# Patient Record
Sex: FEMALE | Race: WHITE
[De-identification: names, ages, dates, MRNs, and addresses within clinical notes are randomized per-mention and may not be internally consistent; named-entity substitution may affect disease eponyms.]

---

## 2017-04-07 ENCOUNTER — HOSPITAL ENCOUNTER (OUTPATIENT)
Dept: HOSPITAL 47 - RADECHMAIN | Age: 72
Discharge: HOME | End: 2017-04-07
Payer: MEDICARE

## 2017-04-07 DIAGNOSIS — I34.8: Primary | ICD-10-CM

## 2017-04-07 DIAGNOSIS — I65.23: ICD-10-CM

## 2017-04-07 DIAGNOSIS — I34.0: ICD-10-CM

## 2017-04-07 DIAGNOSIS — I07.1: ICD-10-CM

## 2017-04-07 PROCEDURE — 93880 EXTRACRANIAL BILAT STUDY: CPT

## 2017-04-07 PROCEDURE — 93306 TTE W/DOPPLER COMPLETE: CPT

## 2017-04-07 NOTE — US
EXAMINATION TYPE: US carotid duplex BILAT

 

DATE OF EXAM: 4/7/2017 3:26 PM

 

COMPARISON: NONE

 

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

 

EXAM MEASUREMENTS: 

 

RIGHT:  Peak Systolic Velocity (PSV) cm/sec

----- Right CCA:  80.3  

----- Right ICA:   86.7     

----- Right ECA:  98.0   

ICA/CCA ratio:    1.1    

 

RIGHT:  End Diastole cm/sec

----- Right CCA:  21.0   

----- Right ICA:   32.3      

----- Right ECA:  10.9     

 

LEFT:  Peak Systolic Velocity (PSV) cm/sec

----- Left CCA:  77.8  

----- Left ICA:   97.6   

----- Left ECA:  94.3  

ICA/CCA ratio:  1.3  

 

LEFT:  End Diastole cm/sec

----- Left CCA:  21.6  

----- Left ICA:   35.9   

----- Left ECA:   9.5 

 

VERTEBRALS (direction of flow):

Right Vertebral: Antegrade

Left Vertebral: Antegrade

 

No significant stenosis seen, no elevated velocities, bilateral plaque noted

 

Grayscale images show mild eccentric hyperechoic plaque at bilateral carotid bulbs. Velocity measurem
ents and ratios remain within normal limits bilaterally.

 

IMPRESSION:  No hemodynamically significant stenosis is seen in either internal carotid artery.

## 2017-04-08 NOTE — ECHOF
Referral Reason:I34.9 Mitral valve disorder, I65.23 Stenosis



MEASUREMENTS

--------

HEIGHT: 154.9 cm

WEIGHT: 59.4 kg

BP: 

IVSd:   0.9 cm     (0.6 - 1.1)

LVIDd:   4.2 cm     (3.9 - 5.3)

LVPWd:   0.9 cm     (0.6 - 1.1)

IVSs:   1.8 cm

LVIDs:   2.0 cm

LVPWs:   1.7 cm

LAESV Index (A-L):   31.66 ml/m

Ao Diam:   2.6 cm     (2.0 - 3.7)

AV Cusp:   1.2 cm     (1.5 - 2.6)

LA Diam:   2.8 cm     (2.7 - 3.8)

MV EXCURSION:   12.148 mm     (> 18.000)

MV EF SLOPE:   47 mm/s     (70 - 150)

EPSS:   0.5 cm

MV E Darío:   1.06 m/s

MV DecT:   225 ms

MV A Darío:   1.38 m/s

MV E/A Ratio:   0.77 

AV maxP.83 mmHg

AV meanP.36 mmHg

RAP:   5.00 mmHg

RVSP:   18.64 mmHg







FINDINGS

--------

Sinus rhythm.

This was a technically good study.

Left ventricular wall thickness is normal.   Overall 

left ventricular systolic function is normal with, an 

EF between 55 - 60 %.

The right ventricle is normal in size and function.

LA is midly dilated 29-33ml/m2.

The right atrium is normal in size.

Aortic valve is trileaflet and is mildly thickened.

The mitral valve leaflets are moderately thickened.   

Moderate mitral annular calcification present.   Mild 

mitral regurgitation is present.

Mild tricuspid regurgitation present.   The right 

ventricular systolic pressure, as measured by Doppler, 

is 18.64mmHg.

Pulmonic valve appears structurally normal.

The aortic root size is normal.

The pericardium is normal.



CONCLUSIONS

--------

1. Sinus rhythm.

2. Moderate mitral annular calcification present.

3. Mild mitral regurgitation is present.

4. Mild tricuspid regurgitation present.

5. The right ventricular systolic pressure, as measured by 

Doppler, is 18.64mmHg.

6. Pulmonic valve appears structurally normal.

7. The aortic root size is normal.

8. The pericardium is normal.

9. This was a technically good study.

10. Left ventricular wall thickness is normal.

11. Overall left ventricular systolic function is normal 

with, an EF between 55 - 60 %.

12. The right ventricle is normal in size and function.

13. LA is midly dilated 29-33ml/m2.

14. The right atrium is normal in size.

15. Aortic valve is trileaflet and is mildly thickened.

16. The mitral valve leaflets are moderately thickened.





SONOGRAPHER: Gina Davis RDCS

## 2018-01-02 ENCOUNTER — HOSPITAL ENCOUNTER (OUTPATIENT)
Dept: HOSPITAL 47 - RADMAMWWP | Age: 73
Discharge: HOME | End: 2018-01-02
Payer: MEDICARE

## 2018-01-02 DIAGNOSIS — Z12.31: Primary | ICD-10-CM

## 2018-01-02 PROCEDURE — 77063 BREAST TOMOSYNTHESIS BI: CPT

## 2018-01-02 PROCEDURE — 77067 SCR MAMMO BI INCL CAD: CPT

## 2018-01-03 NOTE — MM
Reason for exam: screening  (asymptomatic).

Last mammogram was performed 1 year and 1 month ago.



History:

Patient is postmenopausal and history of other cancer.

Family history of breast cancer in mother at age 89, breast cancer in daughter at 

age 43, and breast cancer in daughter at age 49.



Physical Findings:

A clinical breast exam by your physician is recommended on an annual basis and 

results should be correlated with mammographic findings.



MG 3D Screening Mammo W/Cad

Bilateral CC and MLO view(s) were taken.

Prior study comparison: November 28, 2016, bilateral MG 3d screening mammo w/cad. 

November 24, 2015, bilateral MG 3d screening mammo w/cad.

The breast tissue is heterogeneously dense. This may lower the sensitivity of 

mammography.  Developing asymmetry in the left 12 o'clock position.

This finding is changed when compared with previous exams.





ASSESSMENT: Incomplete: need additional imaging evaluation, BI-RAD 0



RECOMMENDATION:

Special view mammogram of the left breast.



If lesion persists on supplemental views, image directed ultrasound is 

recommended.



[facility] will attempt to contact patient to return for supplemental 

views and ultrasound if indicated.

## 2018-01-12 ENCOUNTER — HOSPITAL ENCOUNTER (OUTPATIENT)
Dept: HOSPITAL 47 - RADMAMWWP | Age: 73
Discharge: HOME | End: 2018-01-12
Payer: MEDICARE

## 2018-01-12 DIAGNOSIS — R92.8: Primary | ICD-10-CM

## 2018-01-12 PROCEDURE — 77065 DX MAMMO INCL CAD UNI: CPT

## 2018-01-12 NOTE — MM
Reason for exam: additional evaluation requested from abnormal screening.

Last mammogram was performed less than 1 month ago.



History:

Patient is postmenopausal and history of other cancer.

Family history of breast cancer in mother at age 89, breast cancer in daughter at 

age 43, and breast cancer in daughter at age 49.



Physical Findings:

Nurse did not find any significant physical abnormalities on exam.



MG 3D Work Up W/Cad LT

Spot compression CC, spot compression MLO, and ML view(s) were taken of the left 

breast.

Prior study comparison: January 2, 2018, bilateral MG 3d screening mammo w/cad.  

November 28, 2016, bilateral MG 3d screening mammo w/cad.

The breast tissue is heterogeneously dense. This may lower the sensitivity of 

mammography.  No suspicious abnormality. The previously seen focal asymmetry 

resolves and fibroglandular tissue.



These results were verbally communicated with the patient and result sheet given 

to the patient on 1/12/18.





ASSESSMENT: Negative, BI-RAD 1



RECOMMENDATION:

Return to routine screening mammogram schedule for both breasts.

## 2018-04-09 ENCOUNTER — HOSPITAL ENCOUNTER (OUTPATIENT)
Dept: HOSPITAL 47 - LABWHC1 | Age: 73
Discharge: HOME | End: 2018-04-09
Payer: MEDICARE

## 2018-04-09 DIAGNOSIS — D64.9: ICD-10-CM

## 2018-04-09 DIAGNOSIS — R73.01: ICD-10-CM

## 2018-04-09 DIAGNOSIS — I10: ICD-10-CM

## 2018-04-09 DIAGNOSIS — M47.12: Primary | ICD-10-CM

## 2018-04-09 LAB
ALBUMIN SERPL-MCNC: 4.6 G/DL (ref 3.5–5)
ALP SERPL-CCNC: 131 U/L (ref 38–126)
ALT SERPL-CCNC: 23 U/L (ref 9–52)
ANION GAP SERPL CALC-SCNC: 14 MMOL/L
AST SERPL-CCNC: 19 U/L (ref 14–36)
BASOPHILS # BLD AUTO: 0 K/UL (ref 0–0.2)
BASOPHILS NFR BLD AUTO: 0 %
BUN SERPL-SCNC: 6 MG/DL (ref 7–17)
CALCIUM SPEC-MCNC: 10 MG/DL (ref 8.4–10.2)
CHLORIDE SERPL-SCNC: 96 MMOL/L (ref 98–107)
CHOLEST SERPL-MCNC: 236 MG/DL (ref ?–200)
CK SERPL-CCNC: 37 U/L (ref 30–135)
CO2 SERPL-SCNC: 26 MMOL/L (ref 22–30)
EOSINOPHIL # BLD AUTO: 0.2 K/UL (ref 0–0.7)
EOSINOPHIL NFR BLD AUTO: 3 %
ERYTHROCYTE [DISTWIDTH] IN BLOOD BY AUTOMATED COUNT: 4.16 M/UL (ref 3.8–5.4)
ERYTHROCYTE [DISTWIDTH] IN BLOOD: 12.6 % (ref 11.5–15.5)
GLUCOSE SERPL-MCNC: 105 MG/DL (ref 74–99)
HBA1C MFR BLD: 4.9 % (ref 4–6)
HCT VFR BLD AUTO: 42.4 % (ref 34–46)
HDLC SERPL-MCNC: 121 MG/DL (ref 40–60)
HGB BLD-MCNC: 14.2 GM/DL (ref 11.4–16)
LDLC SERPL CALC-MCNC: 102 MG/DL (ref 0–99)
LYMPHOCYTES # SPEC AUTO: 2.2 K/UL (ref 1–4.8)
LYMPHOCYTES NFR SPEC AUTO: 32 %
MAGNESIUM SPEC-SCNC: 1.7 MG/DL (ref 1.6–2.3)
MCH RBC QN AUTO: 34.2 PG (ref 25–35)
MCHC RBC AUTO-ENTMCNC: 33.5 G/DL (ref 31–37)
MCV RBC AUTO: 102.1 FL (ref 80–100)
MONOCYTES # BLD AUTO: 0.6 K/UL (ref 0–1)
MONOCYTES NFR BLD AUTO: 9 %
NEUTROPHILS # BLD AUTO: 3.7 K/UL (ref 1.3–7.7)
NEUTROPHILS NFR BLD AUTO: 54 %
PH UR: 7.5 [PH] (ref 5–8)
PLATELET # BLD AUTO: 348 K/UL (ref 150–450)
POTASSIUM SERPL-SCNC: 4.2 MMOL/L (ref 3.5–5.1)
PROT SERPL-MCNC: 7.7 G/DL (ref 6.3–8.2)
SODIUM SERPL-SCNC: 136 MMOL/L (ref 137–145)
SP GR UR: 1.01 (ref 1–1.03)
SQUAMOUS UR QL AUTO: 1 /HPF (ref 0–4)
T4 FREE SERPL-MCNC: 1.05 NG/DL (ref 0.78–2.19)
TRIGL SERPL-MCNC: 63 MG/DL (ref ?–150)
URATE SERPL-MCNC: 3 MG/DL (ref 3.7–7.4)
UROBILINOGEN UR QL STRIP: <2 MG/DL (ref ?–2)
VIT B12 SERPL-MCNC: 685 PG/ML (ref 200–944)
WBC # BLD AUTO: 6.9 K/UL (ref 3.8–10.6)
WBC #/AREA URNS HPF: 1 /HPF (ref 0–5)

## 2018-04-09 PROCEDURE — 83550 IRON BINDING TEST: CPT

## 2018-04-09 PROCEDURE — 84439 ASSAY OF FREE THYROXINE: CPT

## 2018-04-09 PROCEDURE — 82728 ASSAY OF FERRITIN: CPT

## 2018-04-09 PROCEDURE — 82550 ASSAY OF CK (CPK): CPT

## 2018-04-09 PROCEDURE — 80061 LIPID PANEL: CPT

## 2018-04-09 PROCEDURE — 83735 ASSAY OF MAGNESIUM: CPT

## 2018-04-09 PROCEDURE — 80053 COMPREHEN METABOLIC PANEL: CPT

## 2018-04-09 PROCEDURE — 81001 URINALYSIS AUTO W/SCOPE: CPT

## 2018-04-09 PROCEDURE — 84550 ASSAY OF BLOOD/URIC ACID: CPT

## 2018-04-09 PROCEDURE — 84443 ASSAY THYROID STIM HORMONE: CPT

## 2018-04-09 PROCEDURE — 82607 VITAMIN B-12: CPT

## 2018-04-09 PROCEDURE — 83540 ASSAY OF IRON: CPT

## 2018-04-09 PROCEDURE — 36415 COLL VENOUS BLD VENIPUNCTURE: CPT

## 2018-04-09 PROCEDURE — 82746 ASSAY OF FOLIC ACID SERUM: CPT

## 2018-04-09 PROCEDURE — 83036 HEMOGLOBIN GLYCOSYLATED A1C: CPT

## 2018-04-09 PROCEDURE — 85025 COMPLETE CBC W/AUTO DIFF WBC: CPT

## 2018-04-09 PROCEDURE — 82306 VITAMIN D 25 HYDROXY: CPT

## 2019-01-04 ENCOUNTER — HOSPITAL ENCOUNTER (OUTPATIENT)
Dept: HOSPITAL 47 - RADMAMWWP | Age: 74
Discharge: HOME | End: 2019-01-04
Attending: INTERNAL MEDICINE
Payer: MEDICARE

## 2019-01-04 DIAGNOSIS — Z12.31: Primary | ICD-10-CM

## 2019-01-04 PROCEDURE — 77067 SCR MAMMO BI INCL CAD: CPT

## 2019-01-04 PROCEDURE — 77063 BREAST TOMOSYNTHESIS BI: CPT

## 2019-01-13 NOTE — MM
Reason for exam: screening  (asymptomatic).

Last mammogram was performed 1 year ago.



History:

Patient is postmenopausal and history of other cancer.

Family history of breast cancer in mother at age 89, premenopausal breast cancer 

in daughter at age 43, and breast cancer in daughter at age 49.



MG 3D Screening Mammo W/Cad

Bilateral CC and MLO view(s) were taken.

Prior study comparison: January 12, 2018, left breast MG 3d work up w/cad LT.  

January 2, 2018, bilateral MG 3d screening mammo w/cad.

The breast tissue is heterogeneously dense. This may lower the sensitivity of 

mammography.

There are benign-appearing bilateral breast calcifications.  No suspicious 

abnormality.  No significant changes when compared with prior studies.





ASSESSMENT: Benign, BI-RAD 2



RECOMMENDATION:

Routine screening mammogram of both breasts in 1 year.

## 2019-04-10 ENCOUNTER — HOSPITAL ENCOUNTER (OUTPATIENT)
Dept: HOSPITAL 47 - LABWHC1 | Age: 74
Discharge: HOME | End: 2019-04-10
Attending: INTERNAL MEDICINE
Payer: MEDICARE

## 2019-04-10 DIAGNOSIS — M81.0: ICD-10-CM

## 2019-04-10 DIAGNOSIS — I10: ICD-10-CM

## 2019-04-10 DIAGNOSIS — R73.01: ICD-10-CM

## 2019-04-10 DIAGNOSIS — Z00.00: Primary | ICD-10-CM

## 2019-04-10 LAB
ALBUMIN SERPL-MCNC: 4.6 G/DL (ref 3.8–4.9)
ALBUMIN/GLOB SERPL: 2.09 G/DL (ref 1.6–3.17)
ALP SERPL-CCNC: 118 U/L (ref 41–126)
ALT SERPL-CCNC: 14 U/L (ref 8–44)
ANION GAP SERPL CALC-SCNC: 9.6 MMOL/L (ref 4–12)
AST SERPL-CCNC: 18 U/L (ref 13–35)
BASOPHILS # BLD AUTO: 0 K/UL (ref 0–0.2)
BASOPHILS NFR BLD AUTO: 1 %
BUN SERPL-SCNC: 6 MG/DL (ref 9–27)
CALCIUM SPEC-MCNC: 9.8 MG/DL (ref 8.7–10.3)
CHLORIDE SERPL-SCNC: 98 MMOL/L (ref 96–109)
CHOLEST SERPL-MCNC: 223 MG/DL (ref 0–200)
CK SERPL-CCNC: 39 U/L (ref 26–186)
CO2 SERPL-SCNC: 26.4 MMOL/L (ref 21.6–31.8)
EOSINOPHIL # BLD AUTO: 0.1 K/UL (ref 0–0.7)
EOSINOPHIL NFR BLD AUTO: 2 %
ERYTHROCYTE [DISTWIDTH] IN BLOOD BY AUTOMATED COUNT: 4.05 M/UL (ref 3.8–5.4)
ERYTHROCYTE [DISTWIDTH] IN BLOOD: 12.7 % (ref 11.5–15.5)
GLOBULIN SER CALC-MCNC: 2.2 G/DL (ref 1.6–3.3)
GLUCOSE SERPL-MCNC: 96 MG/DL (ref 70–110)
HBA1C MFR BLD: 5.1 % (ref 4–6)
HCT VFR BLD AUTO: 41.3 % (ref 34–46)
HDLC SERPL-MCNC: 108 MG/DL (ref 40–60)
HGB BLD-MCNC: 13.6 GM/DL (ref 11.4–16)
LDLC SERPL CALC-MCNC: 105 MG/DL (ref 0–131)
LYMPHOCYTES # SPEC AUTO: 2.1 K/UL (ref 1–4.8)
LYMPHOCYTES NFR SPEC AUTO: 37 %
MAGNESIUM SPEC-SCNC: 1.7 MG/DL (ref 1.5–2.4)
MCH RBC QN AUTO: 33.5 PG (ref 25–35)
MCHC RBC AUTO-ENTMCNC: 32.8 G/DL (ref 31–37)
MCV RBC AUTO: 102.1 FL (ref 80–100)
MONOCYTES # BLD AUTO: 0.5 K/UL (ref 0–1)
MONOCYTES NFR BLD AUTO: 9 %
NEUTROPHILS # BLD AUTO: 2.7 K/UL (ref 1.3–7.7)
NEUTROPHILS NFR BLD AUTO: 48 %
PH UR: 7.5 [PH] (ref 5–8)
PLATELET # BLD AUTO: 325 K/UL (ref 150–450)
POTASSIUM SERPL-SCNC: 4.3 MMOL/L (ref 3.5–5.5)
PROT SERPL-MCNC: 6.8 G/DL (ref 6.2–8.2)
SODIUM SERPL-SCNC: 134 MMOL/L (ref 135–145)
SP GR UR: 1 (ref 1–1.03)
SQUAMOUS UR QL AUTO: 1 /HPF (ref 0–4)
T4 FREE SERPL-MCNC: 1.2 NG/DL (ref 0.8–1.8)
TRIGL SERPL-MCNC: <50 MG/DL (ref 0–149)
URATE SERPL-MCNC: 2.8 MG/DL (ref 2.9–7.7)
UROBILINOGEN UR QL STRIP: <2 MG/DL (ref ?–2)
VLDLC SERPL CALC-MCNC: 9.98 MG/DL (ref 5–40)
WBC # BLD AUTO: 5.5 K/UL (ref 3.8–10.6)
WBC #/AREA URNS HPF: 1 /HPF (ref 0–5)

## 2019-04-10 PROCEDURE — 80053 COMPREHEN METABOLIC PANEL: CPT

## 2019-04-10 PROCEDURE — 85025 COMPLETE CBC W/AUTO DIFF WBC: CPT

## 2019-04-10 PROCEDURE — 82306 VITAMIN D 25 HYDROXY: CPT

## 2019-04-10 PROCEDURE — 84550 ASSAY OF BLOOD/URIC ACID: CPT

## 2019-04-10 PROCEDURE — 83036 HEMOGLOBIN GLYCOSYLATED A1C: CPT

## 2019-04-10 PROCEDURE — 80061 LIPID PANEL: CPT

## 2019-04-10 PROCEDURE — 83735 ASSAY OF MAGNESIUM: CPT

## 2019-04-10 PROCEDURE — 81001 URINALYSIS AUTO W/SCOPE: CPT

## 2019-04-10 PROCEDURE — 84439 ASSAY OF FREE THYROXINE: CPT

## 2019-04-10 PROCEDURE — 36415 COLL VENOUS BLD VENIPUNCTURE: CPT

## 2019-04-10 PROCEDURE — 84443 ASSAY THYROID STIM HORMONE: CPT

## 2019-04-10 PROCEDURE — 82550 ASSAY OF CK (CPK): CPT

## 2019-04-26 ENCOUNTER — HOSPITAL ENCOUNTER (OUTPATIENT)
Dept: HOSPITAL 47 - RADBDWWP | Age: 74
Discharge: HOME | End: 2019-04-26
Attending: INTERNAL MEDICINE
Payer: MEDICARE

## 2019-04-26 DIAGNOSIS — M85.80: Primary | ICD-10-CM

## 2019-04-26 PROCEDURE — 77080 DXA BONE DENSITY AXIAL: CPT

## 2019-04-29 NOTE — BD
EXAMINATION TYPE: Axial Bone Density

 

DATE OF EXAM: 4/26/2019

 

COMPARISON: 4/7/2017

 

CLINICAL HISTORY: 73-year-old female age-related osteoporosis

 

Height:  61

Weight:  140.0

 

FRAX RISK QUESTIONS:

Alcohol (3 or more units per day):  no

Family History (Parent hip fracture):  no

Glucocorticoids (More than 3mos):  no

           (Ex: prednisone, prednisolone, methylprednisolone, dexamethasone, and hydrocortisone).    
     

History of Fracture in Adulthood: yes

Secondary Osteoporosis:

  1.  Type 1 Diabetes: no

  2.  Hyperthyroidism: no

  3.  Menopause before 45: no

  4.  Malnutrition: no

  5.  Chronic liver disease: no

Rheumatoid Arthritis: no

Current Tobacco Use: no

 

RISK FACTORS 

HISTORY OF: 

Hip Fracture (Right/Left): right

When: 2010

Surgery to Spine/Hip(right/left)/Wrist (right/left): right hip /right femur

When: 2010

Family History of Osteoporosis: yes

Active: yes

Diet low in dairy products/other sources of calcium:  no

Postmenopausal woman: age 48

Lost more than 2 inches in height since high school: yes

 

MEDICATIONS: blood pressure

 

 

Additional History:

 

 

EXAM MEASUREMENTS: 

Bone mineral densitometry was performed using the [employer] System.

Bone mineral density as measured about the Lumbar spine is:  

----- L1-L4(G/cm2): 1.445

T Score Values are as follows:

----- L2: 1.2

----- L3: 1.8

----- L4: 3.0

----- L1-L4: 2.2

Bone mineral density has: decreased -2.1 % since study of: 4.7.2017

 

Bone mineral density about the L hip (g/cm2): 0.812

T Score values are as follows:

-----L Neck: -1.6

----L Total: -0.9

Bone mineral density has: increased 1.9 % since study of: 4.7.2017

 

 

IMPRESSION:

Osteopenia (T Score between -2.5 and -1).

 

There is slightly increased risk of fracture and the patient may be considered 

for treatment. 

 

Re-Screen 2-5 years.

 

 

 

 

 

NOTE:  T-SCORE=SD OF THE YOUNG ADULT MEAN.

## 2019-10-14 ENCOUNTER — HOSPITAL ENCOUNTER (OUTPATIENT)
Dept: HOSPITAL 47 - LABWHC1 | Age: 74
Discharge: HOME | End: 2019-10-14
Attending: INTERNAL MEDICINE
Payer: MEDICARE

## 2019-10-14 DIAGNOSIS — I10: Primary | ICD-10-CM

## 2019-10-14 DIAGNOSIS — E78.2: ICD-10-CM

## 2019-10-14 LAB
ALBUMIN SERPL-MCNC: 4.4 G/DL (ref 3.8–4.9)
ALBUMIN/GLOB SERPL: 2.1 G/DL (ref 1.6–3.17)
ALP SERPL-CCNC: 116 U/L (ref 41–126)
ALT SERPL-CCNC: 16 U/L (ref 8–44)
ANION GAP SERPL CALC-SCNC: 12.1 MMOL/L (ref 4–12)
AST SERPL-CCNC: 19 U/L (ref 13–35)
BASOPHILS # BLD AUTO: 0 K/UL (ref 0–0.2)
BASOPHILS NFR BLD AUTO: 1 %
BUN SERPL-SCNC: 7 MG/DL (ref 9–27)
BUN/CREAT SERPL: 14 RATIO (ref 12–20)
CALCIUM SPEC-MCNC: 9.5 MG/DL (ref 8.7–10.3)
CHLORIDE SERPL-SCNC: 95 MMOL/L (ref 96–109)
CHOLEST SERPL-MCNC: 216 MG/DL (ref 0–200)
CO2 SERPL-SCNC: 27.9 MMOL/L (ref 21.6–31.8)
EOSINOPHIL # BLD AUTO: 0.2 K/UL (ref 0–0.7)
EOSINOPHIL NFR BLD AUTO: 2 %
ERYTHROCYTE [DISTWIDTH] IN BLOOD BY AUTOMATED COUNT: 3.86 M/UL (ref 3.8–5.4)
ERYTHROCYTE [DISTWIDTH] IN BLOOD: 11.9 % (ref 11.5–15.5)
GLOBULIN SER CALC-MCNC: 2.1 G/DL (ref 1.6–3.3)
GLUCOSE SERPL-MCNC: 102 MG/DL (ref 70–110)
HBA1C MFR BLD: 5.2 % (ref 4–6)
HCT VFR BLD AUTO: 40.3 % (ref 34–46)
HDLC SERPL-MCNC: 102 MG/DL (ref 40–60)
HGB BLD-MCNC: 13.7 GM/DL (ref 11.4–16)
LYMPHOCYTES # SPEC AUTO: 2.2 K/UL (ref 1–4.8)
LYMPHOCYTES NFR SPEC AUTO: 30 %
MCH RBC QN AUTO: 35.5 PG (ref 25–35)
MCHC RBC AUTO-ENTMCNC: 34 G/DL (ref 31–37)
MCV RBC AUTO: 104.4 FL (ref 80–100)
MONOCYTES # BLD AUTO: 0.5 K/UL (ref 0–1)
MONOCYTES NFR BLD AUTO: 7 %
NEUTROPHILS # BLD AUTO: 4.2 K/UL (ref 1.3–7.7)
NEUTROPHILS NFR BLD AUTO: 57 %
PLATELET # BLD AUTO: 302 K/UL (ref 150–450)
POTASSIUM SERPL-SCNC: 4.6 MMOL/L (ref 3.5–5.5)
PROT SERPL-MCNC: 6.5 G/DL (ref 6.2–8.2)
SODIUM SERPL-SCNC: 135 MMOL/L (ref 135–145)
T4 FREE SERPL-MCNC: 1 NG/DL (ref 0.8–1.8)
TRIGL SERPL-MCNC: <50 MG/DL (ref 0–149)
URATE SERPL-MCNC: 3.1 MG/DL (ref 2.9–7.7)
WBC # BLD AUTO: 7.3 K/UL (ref 3.8–10.6)

## 2019-10-14 PROCEDURE — 80061 LIPID PANEL: CPT

## 2019-10-14 PROCEDURE — 84439 ASSAY OF FREE THYROXINE: CPT

## 2019-10-14 PROCEDURE — 85025 COMPLETE CBC W/AUTO DIFF WBC: CPT

## 2019-10-14 PROCEDURE — 84550 ASSAY OF BLOOD/URIC ACID: CPT

## 2019-10-14 PROCEDURE — 83036 HEMOGLOBIN GLYCOSYLATED A1C: CPT

## 2019-10-14 PROCEDURE — 80053 COMPREHEN METABOLIC PANEL: CPT

## 2019-10-14 PROCEDURE — 36415 COLL VENOUS BLD VENIPUNCTURE: CPT

## 2019-10-14 PROCEDURE — 84443 ASSAY THYROID STIM HORMONE: CPT

## 2020-01-22 ENCOUNTER — HOSPITAL ENCOUNTER (OUTPATIENT)
Dept: HOSPITAL 47 - RADMAMWWP | Age: 75
Discharge: HOME | End: 2020-01-22
Attending: INTERNAL MEDICINE
Payer: MEDICARE

## 2020-01-22 DIAGNOSIS — Z12.31: Primary | ICD-10-CM

## 2020-01-22 PROCEDURE — 77063 BREAST TOMOSYNTHESIS BI: CPT

## 2020-01-22 PROCEDURE — 77067 SCR MAMMO BI INCL CAD: CPT

## 2020-01-23 NOTE — MM
Reason for exam: screening  (asymptomatic).

Last mammogram was performed 1 year and 1 month ago.



History:

Patient is postmenopausal and history of other cancer.

Family history of breast cancer in mother at age 89, premenopausal breast cancer 

in daughter at age 43, and breast cancer in daughter at age 49.



Physical Findings:

A clinical breast exam by your physician is recommended on an annual basis and 

results should be correlated with mammographic findings.



MG 3D Screening Mammo W/Cad

Bilateral CC and MLO view(s) were taken.

Prior study comparison: January 4, 2019, bilateral MG 3d screening mammo w/cad.  

January 12, 2018, left breast MG 3d work up w/cad LT.

The breast tissue is heterogeneously dense. This may lower the sensitivity of 

mammography.  There is no discrete abnormality.  No significant changes when 

compared with prior studies.





ASSESSMENT: Negative, BI-RAD 1



RECOMMENDATION:

Routine screening mammogram of both breasts in 1 year.

## 2021-01-18 ENCOUNTER — HOSPITAL ENCOUNTER (OUTPATIENT)
Dept: HOSPITAL 47 - RADUSWWP | Age: 76
Discharge: HOME | End: 2021-01-18
Attending: INTERNAL MEDICINE
Payer: MEDICARE

## 2021-01-18 DIAGNOSIS — M79.604: Primary | ICD-10-CM

## 2021-01-18 NOTE — US
EXAMINATION TYPE: US venous doppler duplex LE RT

 

DATE OF EXAM: 1/18/2021 12:49 PM

 

COMPARISON: NONE

 

CLINICAL HISTORY: 75-year-old female M79.604 Right leg pain.

 

SIDE PERFORMED: Right  

 

TECHNIQUE:  The lower extremity deep venous system is examined utilizing real time linear array sonog
ana with graded compression, doppler sonography and color-flow sonography.

 

FINDINGS:

 

VESSELS IMAGED:

Common Femoral Vein

Deep Femoral Vein

Greater Saphenous Vein *

Femoral Vein

Popliteal Vein

Small Saphenous Vein *

Proximal Calf Veins

(* superficial vessels)

 

 

 

Right Leg:  Negative for DVT

 

 

 

IMPRESSION:

No evidence for DVT within the right lower extremity imaged from the groin to the upper calf.

## 2021-01-25 ENCOUNTER — HOSPITAL ENCOUNTER (OUTPATIENT)
Dept: HOSPITAL 47 - RADMAMWWP | Age: 76
Discharge: HOME | End: 2021-01-25
Attending: INTERNAL MEDICINE
Payer: MEDICARE

## 2021-01-25 DIAGNOSIS — Z12.31: Primary | ICD-10-CM

## 2021-01-25 PROCEDURE — 77063 BREAST TOMOSYNTHESIS BI: CPT

## 2021-01-25 PROCEDURE — 77067 SCR MAMMO BI INCL CAD: CPT

## 2021-01-26 NOTE — MM
Reason for exam: screening  (asymptomatic).

Last mammogram was performed 1 year ago.



History:

Patient is postmenopausal and history of other cancer.

Family history of breast cancer in mother at age 89, premenopausal breast cancer 

in daughter at age 43, and breast cancer in daughter at age 49.

Took hormonal contraceptives for 5 years.



Physical Findings:

A clinical breast exam by your physician is recommended on an annual basis and 

results should be correlated with mammographic findings.



MG 3D Screening Mammo W/Cad

Bilateral CC and MLO view(s) were taken.

Prior study comparison: January 22, 2020, bilateral MG 3d screening mammo w/cad.  

January 4, 2019, bilateral MG 3d screening mammo w/cad.

The breast tissue is heterogeneously dense. This may lower the sensitivity of 

mammography.  Stable vascular calcifications. There is no discrete abnormality.  

No significant changes when compared with prior studies.





ASSESSMENT: Benign, BI-RAD 2



RECOMMENDATION:

Routine screening mammogram of both breasts in 1 year.

## 2021-04-28 ENCOUNTER — HOSPITAL ENCOUNTER (OUTPATIENT)
Dept: HOSPITAL 47 - RADBDWWP | Age: 76
Discharge: HOME | End: 2021-04-28
Attending: INTERNAL MEDICINE
Payer: MEDICARE

## 2021-04-28 DIAGNOSIS — M81.8: Primary | ICD-10-CM

## 2021-04-28 PROCEDURE — 77080 DXA BONE DENSITY AXIAL: CPT

## 2021-04-30 NOTE — BD
EXAMINATION TYPE: Axial Bone Density

 

DATE OF EXAM: 4/28/2021

 

COMPARISON: NONE

 

CLINICAL HISTORY:

 

Height:  60.5

Weight:  142.5

 

FRAX RISK QUESTIONS:

Alcohol (3 or more units per day):  no

Family History (Parent hip fracture):  no

Glucocorticoids (More than 3mos):  no

           (Ex: prednisone, prednisolone, methylprednisolone, dexamethasone, and hydrocortisone).    
     

History of Fracture in Adulthood: yes

Secondary Osteoporosis:

  1.  Type 1 Diabetes: no

  2.  Hyperthyroidism: no

  3.  Menopause before 45: no

  4.  Malnutrition: no

  5.  Chronic liver disease: no

Rheumatoid Arthritis: yes

Current Tobacco Use: no

 

RISK FACTORS 

HISTORY OF: 

Hip Fracture (Right/Left): right 

When: 4 years ago

Surgery to Spine/Hip(right/left)/Wrist (right/left): right hip 

When: 4 years

Family History of Osteoporosis: yes

Active: yes

Diet low in dairy products/other sources of calcium:  no

Postmenopausal woman: age 48

Lost more than 2 inches in height since high school: yes

 

 

MEDICATIONS: scanned into pacs

 

 

Additional History:

 

 

EXAM MEASUREMENTS: 

Bone mineral densitometry was performed using the [employer] System.

Bone mineral density as measured about the Lumbar spine is:  

----- L1-L4(G/cm2): 1.514

T Score Values are as follows:

----- L2: 1.8

----- L3: 2.8

----- L4: 3.7

----- L1-L4: 2.8

Bone mineral density has: increased 5.6 % since study of: 4.26.2019

 

Bone mineral density about the L hip (g/cm2): 0.818

T Score values are as follows:

-----L Neck: -1.6

-----L Total: -0.9

Bone mineral density has: decreased -0.7 % since study of: 4.26.2019

 

 

IMPRESSION:

No evidence for osteoporosis or osteopenia

 

NOTE:  T-SCORE=SD OF THE YOUNG ADULT MEAN.

## 2021-07-08 ENCOUNTER — HOSPITAL ENCOUNTER (OUTPATIENT)
Dept: HOSPITAL 47 - LABWHC1 | Age: 76
Discharge: HOME | End: 2021-07-08
Attending: INTERNAL MEDICINE
Payer: MEDICARE

## 2021-07-08 DIAGNOSIS — I10: Primary | ICD-10-CM

## 2021-07-08 DIAGNOSIS — E78.2: ICD-10-CM

## 2021-07-08 LAB
ALBUMIN SERPL-MCNC: 4.5 G/DL (ref 3.8–4.9)
ALBUMIN/GLOB SERPL: 1.8 G/DL (ref 1.6–3.17)
ALP SERPL-CCNC: 123 U/L (ref 41–126)
ALT SERPL-CCNC: 16 U/L (ref 8–44)
ANION GAP SERPL CALC-SCNC: 9.2 MMOL/L (ref 4–12)
AST SERPL-CCNC: 23 U/L (ref 13–35)
BASOPHILS # BLD AUTO: 0.06 X 10*3/UL (ref 0–0.1)
BASOPHILS NFR BLD AUTO: 0.9 %
BUN SERPL-SCNC: 6 MG/DL (ref 9–27)
BUN/CREAT SERPL: 12 RATIO (ref 12–20)
CALCIUM SPEC-MCNC: 9.4 MG/DL (ref 8.7–10.3)
CHLORIDE SERPL-SCNC: 97 MMOL/L (ref 96–109)
CHOLEST SERPL-MCNC: 202 MG/DL (ref 0–200)
CO2 SERPL-SCNC: 25.8 MMOL/L (ref 21.6–31.8)
EOSINOPHIL # BLD AUTO: 0.16 X 10*3/UL (ref 0.04–0.35)
EOSINOPHIL NFR BLD AUTO: 2.4 %
ERYTHROCYTE [DISTWIDTH] IN BLOOD BY AUTOMATED COUNT: 3.84 X 10*6/UL (ref 4.1–5.2)
ERYTHROCYTE [DISTWIDTH] IN BLOOD: 13 % (ref 11.5–14.5)
GLOBULIN SER CALC-MCNC: 2.5 G/DL (ref 1.6–3.3)
GLUCOSE SERPL-MCNC: 97 MG/DL (ref 70–110)
HCT VFR BLD AUTO: 39.9 % (ref 37.2–46.3)
HDLC SERPL-MCNC: 107 MG/DL (ref 40–60)
HGB BLD-MCNC: 13.2 G/DL (ref 12–15)
LYMPHOCYTES # SPEC AUTO: 2.37 X 10*3/UL (ref 0.9–5)
LYMPHOCYTES NFR SPEC AUTO: 35.4 %
MCH RBC QN AUTO: 34.4 PG (ref 27–32)
MCHC RBC AUTO-ENTMCNC: 33.1 G/DL (ref 32–37)
MCV RBC AUTO: 103.9 FL (ref 80–97)
MONOCYTES # BLD AUTO: 0.89 X 10*3/UL (ref 0.2–1)
MONOCYTES NFR BLD AUTO: 13.3 %
NEUTROPHILS # BLD AUTO: 3.2 X 10*3/UL (ref 1.8–7.7)
NEUTROPHILS NFR BLD AUTO: 47.9 %
PLATELET # BLD AUTO: 337 X 10*3/UL (ref 140–440)
POTASSIUM SERPL-SCNC: 4.4 MMOL/L (ref 3.5–5.5)
PROT SERPL-MCNC: 7 G/DL (ref 6.2–8.2)
SODIUM SERPL-SCNC: 132 MMOL/L (ref 135–145)
TRIGL SERPL-MCNC: <50 MG/DL (ref 0–149)
WBC # BLD AUTO: 6.69 X 10*3/UL (ref 4.5–10)

## 2021-07-08 PROCEDURE — 84443 ASSAY THYROID STIM HORMONE: CPT

## 2021-07-08 PROCEDURE — 36415 COLL VENOUS BLD VENIPUNCTURE: CPT

## 2021-07-08 PROCEDURE — 85025 COMPLETE CBC W/AUTO DIFF WBC: CPT

## 2021-07-08 PROCEDURE — 80053 COMPREHEN METABOLIC PANEL: CPT

## 2021-07-08 PROCEDURE — 80061 LIPID PANEL: CPT

## 2021-08-03 ENCOUNTER — HOSPITAL ENCOUNTER (OUTPATIENT)
Dept: HOSPITAL 47 - RADECHMAIN | Age: 76
Discharge: HOME | End: 2021-08-03
Attending: INTERNAL MEDICINE
Payer: MEDICARE

## 2021-08-03 DIAGNOSIS — I08.3: Primary | ICD-10-CM

## 2021-08-03 DIAGNOSIS — I27.20: ICD-10-CM

## 2021-08-03 DIAGNOSIS — R09.89: ICD-10-CM

## 2021-08-03 PROCEDURE — 93306 TTE W/DOPPLER COMPLETE: CPT

## 2021-08-03 PROCEDURE — 93880 EXTRACRANIAL BILAT STUDY: CPT

## 2021-08-03 NOTE — US
EXAMINATION TYPE: US carotid duplex BILAT

 

DATE OF EXAM: 8/3/2021

 

COMPARISON: US 2017

 

CLINICAL HISTORY: 76-year-old female R09.89 Bilateral carotid bruits.

 

EXAM MEASUREMENTS: 

 

RIGHT:  Peak Systolic Velocity (PSV) cm/sec

----- Right CCA:  94.3  

----- Right ICA:  106.0     

----- Right ECA:  109.9   

ICA/CCA ratio:  1.1    

 

RIGHT:  End Diastole cm/sec

----- Right CCA:  25.5   

----- Right ICA:  34.6      

----- Right ECA:  13.8     

 

LEFT:  Peak Systolic Velocity (PSV) cm/sec

----- Left CCA:  102.5  (proximal 152.6 cm/s)

----- Left ICA:  112.6   

----- Left ECA:  102.5  

ICA/CCA ratio:  1.1  

 

LEFT:  End Diastole cm/sec

----- Left CCA:  20.9  

----- Left ICA:  35.9   

----- Left ECA:  10.8 

 

VERTEBRALS (direction of flow):

Right Vertebral: Antegrade

Left Vertebral: Antegrade

 

Rhythm:  Normal

 

Sonographer notes: Elevated velocity: prox left CCA

 

No significant stenosis.

 

 

 

IMPRESSION:  

1. Mildly elevated velocity proximal left common carotid artery could reflect a mild to moderate sten
osis at its origin.   

2. Otherwise, no hemodynamically significant ICA stenosis on either side.

 

 

NASCET criteria was used in interpretation of this exam?

 

Criteria for Assigning % of Stenosis / Diameter reduction

(Estimation based on the indirect measurements of the internal carotid artery velocities (ICA PSV).

1.  Normal (no stenosis)=ICA PSV < 125 cm/s: ratio < 2.0: ICA EDV<40 cm/s.

2. Less than 50% stenosis=ICA PSV < 125 cm/s: ratio < 2.0: ICA EDV<40 cm/s.

3.  50 to 69% stenosis=ICA PSV of 125 to 230 cm/s: ration 2.0 ? 4.0: ICA EDV  cm/s.

4.  Greater than 70% stenosis to near occlusion= ICA PSV > 230 cm/s: ratio > 4.0: ICA EDV > 100 cm/s.
 

5.  Near occlusion= ICA PSV velocities may be low or undetectable: variable ratio and ICA EDV.

6.  Total occlusion=unable to detect flow.

## 2021-08-04 NOTE — ECHOF
Referral Reason:I35.1 Nonrheumatic aortic (valve) insufficiency



MEASUREMENTS

--------

HEIGHT: 152.4 cm

WEIGHT: 64.9 kg

BP: 

IVSd:   1.2 cm     (0.6 - 1.1)

LVIDd:   4.1 cm     (3.9 - 5.3)

LVPWd:   0.9 cm     (0.6 - 1.1)

IVSs:   1.5 cm

LVIDs:   3.2 cm

LVPWs:   1.2 cm

LAESV Index (A-L):   45.56 ml/m

Ao Diam:   3.0 cm     (2.0 - 3.7)

MV E Darío:   0.93 m/s

MV DecT:   279 ms

MV A Darío:   1.39 m/s

MV E/A Ratio:   0.67 

AV maxP.98 mmHg

AV meanP.58 mmHg

RAP:   5.00 mmHg

RVSP:   37.86 mmHg







FINDINGS

--------

Sinus rhythm.

This was a technically adequate study.

The left ventricular size is normal.   There is mild concentric left ventricular hypertrophy.   Overa
ll left ventricular systolic function is normal with, an EF between 55 - 60 %.   Pseudonormal LV fill
ing pattern, consistent with elevated LA pressure.

The right ventricle is normal in size.

LA is severely dilated >40 ml/m2

The right atrial size is normal.

There is mild aortic stenosis present.   Peak/mean gradient across the Aortic Valve is 17.98mmHg / 8.
58mmHg.

Severe mitral annular calcification present.   Mild mitral regurgitation is present.    The peak and 
mean MV gradients are 10.61mmHg 3.65mmHg as measured by doppler.

The tricuspid valve appears structurally normal.   Mild tricuspid regurgitation present.   There is m
ild pulmonary hypertension.   The right ventricular systolic pressure, as measured by Doppler, is 37.
86mmHg.

There is no pulmonic regurgitation present.

The aortic root size is normal.

There is no pericardial effusion.



CONCLUSIONS

--------

1. There is mild concentric left ventricular hypertrophy.

2. Overall left ventricular systolic function is normal with, an EF between 55 - 60 %.

3. LA is severely dilated >40 ml/m2

4. There is mild aortic stenosis present.

5. Peak/mean gradient across the Aortic Valve is 17.98mmHg / 8.58mmHg.

6. Severe mitral annular calcification present.

7. Mild mitral regurgitation is present.

8. The peak and mean MV gradients are 10.61mmHg 3.65mmHg as measured by doppler.

9. Mild tricuspid regurgitation present.

10. There is mild pulmonary hypertension.

11. There is no pericardial effusion.





SONOGRAPHER: Carie Bartlett RDCS

## 2022-03-15 ENCOUNTER — HOSPITAL ENCOUNTER (OUTPATIENT)
Dept: HOSPITAL 47 - RADMAMWWP | Age: 77
Discharge: HOME | End: 2022-03-15
Attending: INTERNAL MEDICINE
Payer: MEDICARE

## 2022-03-15 DIAGNOSIS — Z80.3: ICD-10-CM

## 2022-03-15 DIAGNOSIS — Z12.31: Primary | ICD-10-CM

## 2022-03-15 DIAGNOSIS — Z78.0: ICD-10-CM

## 2022-03-15 PROCEDURE — 77063 BREAST TOMOSYNTHESIS BI: CPT

## 2022-03-15 PROCEDURE — 77067 SCR MAMMO BI INCL CAD: CPT

## 2022-03-16 NOTE — MM
Reason for exam: screening  (asymptomatic).

Last mammogram was performed 1 year and 2 months ago.



History:

Patient is postmenopausal and history of other cancer.

Family history of breast cancer in mother at age 89, premenopausal breast cancer 

in daughter at age 43, and breast cancer in daughter at age 49.

Took hormonal contraceptives for 5 years.



Physical Findings:

A clinical breast exam by your physician is recommended on an annual basis and 

results should be correlated with mammographic findings.



MG 3D Screening Mammo W/Cad

Bilateral CC and MLO view(s) were taken.  XCCL view(s) were taken of the right 

breast.

Prior study comparison: January 25, 2021, bilateral MG 3d screening mammo w/cad.  

January 22, 2020, bilateral MG 3d screening mammo w/cad.

The breast tissue is heterogeneously dense. This may lower the sensitivity of 

mammography.  Asymmetric breast tissue right breast medially CC view.





ASSESSMENT: Incomplete: need additional imaging evaluation, BI-RAD 0



RECOMMENDATION:

Ultrasound of the right breast.



[facility] will attempt to contact patient  to return for ultrasound.

## 2022-06-07 ENCOUNTER — HOSPITAL ENCOUNTER (OUTPATIENT)
Dept: HOSPITAL 47 - LABWHC1 | Age: 77
Discharge: HOME | End: 2022-06-07
Attending: INTERNAL MEDICINE
Payer: MEDICARE

## 2022-06-07 DIAGNOSIS — E78.2: ICD-10-CM

## 2022-06-07 DIAGNOSIS — I10: Primary | ICD-10-CM

## 2022-06-07 DIAGNOSIS — M81.0: ICD-10-CM

## 2022-06-07 LAB
ALBUMIN SERPL-MCNC: 4.4 G/DL (ref 3.8–4.9)
ALBUMIN/GLOB SERPL: 1.86 G/DL (ref 1.6–3.17)
ALP SERPL-CCNC: 98 U/L (ref 41–126)
ALT SERPL-CCNC: 12 U/L (ref 8–44)
ANION GAP SERPL CALC-SCNC: 11.1 MMOL/L (ref 10–18)
AST SERPL-CCNC: 13 U/L (ref 13–35)
BASOPHILS # BLD AUTO: 0.04 X 10*3/UL (ref 0–0.1)
BASOPHILS NFR BLD AUTO: 0.7 %
BUN SERPL-SCNC: 6.4 MG/DL (ref 9–27)
BUN/CREAT SERPL: 11.74 RATIO (ref 12–20)
CALCIUM SPEC-MCNC: 9.6 MG/DL (ref 8.7–10.3)
CHLORIDE SERPL-SCNC: 96 MMOL/L (ref 96–109)
CHOLEST SERPL-MCNC: 218 MG/DL (ref 0–200)
CO2 SERPL-SCNC: 25.5 MMOL/L (ref 20–27.5)
EOSINOPHIL # BLD AUTO: 0.13 X 10*3/UL (ref 0.04–0.35)
EOSINOPHIL NFR BLD AUTO: 2.2 %
ERYTHROCYTE [DISTWIDTH] IN BLOOD BY AUTOMATED COUNT: 3.78 X 10*6/UL (ref 4.1–5.2)
ERYTHROCYTE [DISTWIDTH] IN BLOOD: 13.3 % (ref 11.5–14.5)
GLOBULIN SER CALC-MCNC: 2.4 G/DL (ref 1.6–3.3)
GLUCOSE SERPL-MCNC: 101 MG/DL (ref 70–110)
HCT VFR BLD AUTO: 39 % (ref 37.2–46.3)
HDLC SERPL-MCNC: 90.5 MG/DL (ref 40–60)
HGB BLD-MCNC: 12.5 G/DL (ref 12–15)
IMM GRANULOCYTES BLD QL AUTO: 0.2 %
LDLC SERPL CALC-MCNC: 116.2 MG/DL (ref 0–131)
LYMPHOCYTES # SPEC AUTO: 2 X 10*3/UL (ref 0.9–5)
LYMPHOCYTES NFR SPEC AUTO: 34.5 %
MAGNESIUM SPEC-SCNC: 1.7 MG/DL (ref 1.5–2.4)
MCH RBC QN AUTO: 33.1 PG (ref 27–32)
MCHC RBC AUTO-ENTMCNC: 32.1 G/DL (ref 32–37)
MCV RBC AUTO: 103.2 FL (ref 80–97)
MONOCYTES # BLD AUTO: 0.68 X 10*3/UL (ref 0.2–1)
MONOCYTES NFR BLD AUTO: 11.7 %
NEUTROPHILS # BLD AUTO: 2.93 X 10*3/UL (ref 1.8–7.7)
NEUTROPHILS NFR BLD AUTO: 50.7 %
NRBC BLD AUTO-RTO: 0 /100 WBCS (ref 0–0)
PLATELET # BLD AUTO: 295 X 10*3/UL (ref 140–440)
POTASSIUM SERPL-SCNC: 4.7 MMOL/L (ref 3.5–5.5)
PROT SERPL-MCNC: 6.7 G/DL (ref 6.2–8.2)
SODIUM SERPL-SCNC: 133 MMOL/L (ref 135–145)
T4 FREE SERPL-MCNC: 1.16 NG/DL (ref 0.8–1.8)
TRIGL SERPL-MCNC: 56.4 MG/DL (ref 0–149)
VLDLC SERPL CALC-MCNC: 11.28 MG/DL (ref 5–40)
WBC # BLD AUTO: 5.79 X 10*3/UL (ref 4.5–10)

## 2022-06-07 PROCEDURE — 36415 COLL VENOUS BLD VENIPUNCTURE: CPT

## 2022-06-07 PROCEDURE — 83036 HEMOGLOBIN GLYCOSYLATED A1C: CPT

## 2022-06-07 PROCEDURE — 85025 COMPLETE CBC W/AUTO DIFF WBC: CPT

## 2022-06-07 PROCEDURE — 80061 LIPID PANEL: CPT

## 2022-06-07 PROCEDURE — 84443 ASSAY THYROID STIM HORMONE: CPT

## 2022-06-07 PROCEDURE — 84439 ASSAY OF FREE THYROXINE: CPT

## 2022-06-07 PROCEDURE — 83735 ASSAY OF MAGNESIUM: CPT

## 2022-06-07 PROCEDURE — 80053 COMPREHEN METABOLIC PANEL: CPT

## 2023-03-16 ENCOUNTER — HOSPITAL ENCOUNTER (OUTPATIENT)
Dept: HOSPITAL 47 - RADMAMWWP | Age: 78
Discharge: HOME | End: 2023-03-16
Attending: INTERNAL MEDICINE
Payer: MEDICARE

## 2023-03-16 DIAGNOSIS — Z12.31: Primary | ICD-10-CM

## 2023-03-16 DIAGNOSIS — Z80.3: ICD-10-CM

## 2023-03-16 DIAGNOSIS — Z78.0: ICD-10-CM

## 2023-03-16 PROCEDURE — 77063 BREAST TOMOSYNTHESIS BI: CPT

## 2023-03-16 PROCEDURE — 77067 SCR MAMMO BI INCL CAD: CPT

## 2023-03-16 NOTE — MM
Reason for Exam: Screening  (asymptomatic). 

Last screening mammogram was performed 12 month(s) ago.





Patient History: 

Menarche at age 10. First Full-Term Pregnancy at age 20. Postmenopausal. Other cancer. Patient used

Hormonal Contraceptives for 5 years.

Daughter had breast cancer, age 43. Daughter had breast cancer, age 49. Mother had breast cancer,

age 89. 





Risk Values: 

Aminata 5 year model risk: 6.8%.

NCI Lifetime model risk: 11.1%.





Prior Study Comparison: 

1/22/2020 Bilateral Screening Mammogram, [facility]. 1/25/2021 Bilateral Screening Mammogram, [facility].

3/15/2022 Bilateral Screening Mammogram, [facility]. 





Tissue Density: 

The breast tissue is heterogeneously dense. This may lower the sensitivity of mammography.





Findings: 

Analyzed By CAD. 

Benign-appearing calcifications bilaterally.

There is no suspicious group of microcalcifications or new suspicious mass in either breast. 





Overall Assessment: Benign, BI-RAD 2





Management: 

Screening Mammogram of both breasts in 1 year.

A clinical breast exam by your physician is recommended on an annual basis and results should be

correlated with mammographic findings.  [facility] will attempt to contact patient to

return for supplemental views and ultrasound if indicated.



Electronically signed and approved by: Chapo Herron DO

## 2024-03-18 ENCOUNTER — HOSPITAL ENCOUNTER (OUTPATIENT)
Dept: HOSPITAL 47 - RADBDWWP | Age: 79
Discharge: HOME | End: 2024-03-18
Attending: INTERNAL MEDICINE
Payer: MEDICARE

## 2024-03-18 DIAGNOSIS — Z80.3: ICD-10-CM

## 2024-03-18 DIAGNOSIS — Z12.31: Primary | ICD-10-CM

## 2024-03-18 DIAGNOSIS — Z78.0: ICD-10-CM

## 2024-03-18 DIAGNOSIS — M85.88: ICD-10-CM

## 2024-03-18 PROCEDURE — 77067 SCR MAMMO BI INCL CAD: CPT

## 2024-03-18 PROCEDURE — 77063 BREAST TOMOSYNTHESIS BI: CPT

## 2024-03-18 PROCEDURE — 77080 DXA BONE DENSITY AXIAL: CPT

## 2024-03-19 NOTE — BD
EXAMINATION TYPE: Axial Bone Density

 

DATE OF EXAM: 3/18/2024

 

CLINICAL HISTORY: 78 years old Female.  ICD-10 CODE: M81.0 AGE-RELATED OSTEOPOROSIS W/O CURRENT PATHO
LO

 

Height:  60

Weight:  146.0

 

FRAX RISK QUESTIONS:

 

Alcohol (3 or more units per day):  no

Family History (Parent hip fracture):  no

Glucocorticoids (More than 3mos):  no

           (Ex: prednisone, prednisolone, methylprednisolone, dexamethasone, and hydrocortisone).    
     

History of Fracture in Adulthood: yes

Secondary Osteoporosis:

  1.  Type 1 Diabetes: no

  2.  Hyperthyroidism: no

  3.  Menopause before 45: no

  4.  Malnutrition: no

  5.  Chronic liver disease: no

Rheumatoid Arthritis: yes

Current Tobacco Use: no

 

RISK FACTORS 

 

HISTORY OF: 

Hip Fracture (Right/Left): right

When: 2011

Surgery to Spine/Hip(right/left)/Wrist (right/left): right hip

When: 2011

 

 

EXAM MEASUREMENTS: 

Bone mineral densitometry was performed using the [employer] System.

Bone mineral density as measured about the Lumbar spine is:  

----- L1-L4(G/cm2): 1.663

T Score Values are as follows:

----- L1: 3.0

----- L2: 4.3

----- L3: 4.2

----- L4: 4.3

----- L1-L4: 4.0

 

Z Score Values are as follows:

----- L1: 4.8

----- L2: 6.1

----- L3: 6.1

----- L4: 6.1

----- L1-L4: 5.8

 

Bone mineral density has: increased 9.8 % since study of: 4.28.2021

 

Bone mineral density about the L hip (g/cm2): 0.911

T Score values are as follows:

-----L Neck: -1.6

-----L Total: -0.8

 

Z Score values are as follows:

-----L Neck: 0.5

-----L Total: 1.2

 

Bone mineral density has: increased 2.0 % since study of: 4.28.2021

 

 

FRAX%s: The graph provided illustrates a 25.4% chance for a major osteoporotic fx and a 6.6% chance f
or the hips probability for fx in 10 years time.

 

 

 

 

IMPRESSION:

Normal (Values between +1 and -1 indicate normal bone mass).  Consider repeating this study in 5 year
s or sooner if there is some new clinical indication.

 

NOTE:  T-SCORE=SD OF THE YOUNG ADULT MEAN.

## 2024-03-19 NOTE — MM
Reason for Exam: Screening  (asymptomatic). 

Last screening mammogram was performed 12 month(s) ago.





Patient History: 

Menarche at age 10. First Full-Term Pregnancy at age 20. Postmenopausal. Other cancer. Patient used

Hormonal Contraceptives for 5 years.

Daughter had breast cancer, age 43. Daughter had breast cancer, age 49. Mother had breast cancer,

age 89. 





Risk Values: 

Aminata 5 year model risk: 6.6%.

NCI Lifetime model risk: 10.1%.





Prior Study Comparison: 

1/25/2021 Bilateral Screening Mammogram, [facility]. 3/15/2022 Bilateral Screening Mammogram, [facility].

3/16/2023 Bilateral MG 3D screening mammo w/cad, [facility]. 





Tissue Density: 

The breasts are heterogeneously dense, which may obscure small masses.





Findings: 

Analyzed By CAD. 

There is no suspicious group of microcalcifications or new suspicious mass. 





Overall Assessment: Negative, BI-RAD 1





Management: 

Screening Mammogram of both breasts in 1 year.

[facility] will attempt to contact patient to return for supplemental views and

ultrasound if indicated.



Patient should continue monthly self-breast exams.  A clinical breast exam by your physician is

recommended on an annual basis.

This exam should not preclude additional follow-up of suspicious palpable abnormalities.



Note on Aminata scores and lifetime risk:

1. A Aminata score greater than 3% is considered moderate risk. If this is the case, consider

specialist referral to assess eligibility for a risk reducing agent.

2. If overall lifetime risk for the development of breast cancer is 20% or higher, the patient may

qualify for future screening with alternating mammogram and breast MRI.



Electronically signed and approved by: Chapo Herron DO

## 2024-06-05 ENCOUNTER — HOSPITAL ENCOUNTER (OUTPATIENT)
Dept: HOSPITAL 47 - EC | Age: 79
Setting detail: OBSERVATION
LOS: 1 days | Discharge: HOME HEALTH | End: 2024-06-06
Attending: INTERNAL MEDICINE | Admitting: INTERNAL MEDICINE
Payer: MEDICARE

## 2024-06-05 DIAGNOSIS — L97.912: ICD-10-CM

## 2024-06-05 DIAGNOSIS — Z88.5: ICD-10-CM

## 2024-06-05 DIAGNOSIS — Z85.828: ICD-10-CM

## 2024-06-05 DIAGNOSIS — M81.0: ICD-10-CM

## 2024-06-05 DIAGNOSIS — M85.80: ICD-10-CM

## 2024-06-05 DIAGNOSIS — M79.81: Primary | ICD-10-CM

## 2024-06-05 DIAGNOSIS — M19.90: ICD-10-CM

## 2024-06-05 DIAGNOSIS — M47.814: ICD-10-CM

## 2024-06-05 DIAGNOSIS — I11.9: ICD-10-CM

## 2024-06-05 DIAGNOSIS — Z88.2: ICD-10-CM

## 2024-06-05 DIAGNOSIS — L03.115: ICD-10-CM

## 2024-06-05 DIAGNOSIS — E78.2: ICD-10-CM

## 2024-06-05 DIAGNOSIS — M47.816: ICD-10-CM

## 2024-06-05 DIAGNOSIS — I35.0: ICD-10-CM

## 2024-06-05 DIAGNOSIS — E55.9: ICD-10-CM

## 2024-06-05 DIAGNOSIS — Z79.899: ICD-10-CM

## 2024-06-05 DIAGNOSIS — Z79.83: ICD-10-CM

## 2024-06-05 DIAGNOSIS — Z79.82: ICD-10-CM

## 2024-06-05 PROCEDURE — 96365 THER/PROPH/DIAG IV INF INIT: CPT

## 2024-06-05 PROCEDURE — 87040 BLOOD CULTURE FOR BACTERIA: CPT

## 2024-06-05 PROCEDURE — 85025 COMPLETE CBC W/AUTO DIFF WBC: CPT

## 2024-06-05 PROCEDURE — 73590 X-RAY EXAM OF LOWER LEG: CPT

## 2024-06-05 PROCEDURE — 96366 THER/PROPH/DIAG IV INF ADDON: CPT

## 2024-06-05 PROCEDURE — 83735 ASSAY OF MAGNESIUM: CPT

## 2024-06-05 PROCEDURE — 84100 ASSAY OF PHOSPHORUS: CPT

## 2024-06-05 PROCEDURE — 99284 EMERGENCY DEPT VISIT MOD MDM: CPT

## 2024-06-05 PROCEDURE — 80053 COMPREHEN METABOLIC PANEL: CPT

## 2024-06-05 RX ADMIN — ACETAMINOPHEN PRN MG: 325 TABLET, FILM COATED ORAL at 17:56

## 2024-06-05 RX ADMIN — CEFAZOLIN SCH MLS/HR: 330 INJECTION, POWDER, FOR SOLUTION INTRAMUSCULAR; INTRAVENOUS at 15:46

## 2024-06-05 NOTE — XR
EXAMINATION TYPE: XR tibia fibula RT

 

DATE OF EXAM: 6/5/2024 11:27 AM

 

CLINICAL INDICATION:Female, 79 years old with history of Injury;

 

COMPARISON: 3/12/2012.

 

TECHNIQUE: XR tibia fibula RT; tibia/fibula was examined in AP and lateral projections.  

 

FINDINGS: Soft tissue defect in the area of concern. No evidence for osseous erosion. Total knee arth
roplasty changes appear intact. No evidence of any acute osseous pathology, joint dislocation, or sof
t tissue swelling is noted.

 

IMPRESSION: 

1.  No evidence of acute fracture.

2.  No evidence for osseous erosion.

3.  Total knee arthroplasty changes which appear intact.

## 2024-06-05 NOTE — ED
Lower Extremity Injury HPI





- General


Source: patient, RN notes reviewed


Mode of arrival: ambulatory


Limitations: no limitations





<Chaparrita Bustamante - Last Filed: 06/05/24 10:58>





- General


Source: RN notes reviewed, old records reviewed


Mode of arrival: ambulatory


Limitations: no limitations





- History of Present Illness


MD Complaint: leg injury


-: week(s) (2)


Injury: Leg: Right


Place: home


Severity: mild


Severity scale (1-10): 2


Worsens With: nothing


Context: direct blow


Associated Symptoms: swelling, tingling


Treatments Prior to Arrival: bandage





<Parish Yanes - Last Filed: 06/05/24 15:18>





- General


Chief Complaint: Extremity Injury, Lower


Stated Complaint: Hematoma on R leg


Time Seen by Provider: 06/05/24 10:59





- History of Present Illness


Initial Comments: 


Quick Note: This is an 79-year-old female who presents to the emergency 

department for a right leg injury.  States that 2 weeks ago she was hit in the 

leg with a soccer ball and has since developed a large hematoma.  She went to 

[facility] when she had the initial injury and was told that this

would go away, however it is continuing to persist.  Not taking any blood 

thinners.  States that this is painful.


 (Chaparrita Bustamante)


This is a 79-year-old female to the ER for right leg pain right leg injury with 

significant hematoma in that leg.  Patient has had multiple evaluations were 

symptoms have not gotten any better this has been 2 weeks of persistent pain 

(Parish Yanes)





- Related Data


                                Home Medications











 Medication  Instructions  Recorded  Confirmed


 


Aspirin 81 mg PO DAILY 05/13/14 07/23/16


 


Multivitamin/Iron/Folic Acid 1 tab PO DAILY 05/13/14 07/23/16





[Centrum Complete Multivit Tab]   


 


Calcium Carbonate/Vitamin D3 1 each PO BID 06/11/14 07/23/16





[Caltrate 600 + D Tablet]   


 


Folic Acid 800 mg PO DAILY 06/12/14 07/23/16


 


Losartan Potassium 100 mg PO QAM 11/24/14 07/23/16


 


amLODIPine BESYLATE [Amlodipine 5 mg PO QAM 01/12/15 07/23/16





Besylate]   








                                  Previous Rx's











 Medication  Instructions  Recorded


 


diazePAM [Valium] 5 mg PO TID #9 tab 07/23/16











                                    Allergies











Allergy/AdvReac Type Severity Reaction Status Date / Time


 


codeine Allergy  Unknown Verified 06/05/24 11:00


 


hydrocodone bitartrate Allergy  Itching Verified 06/05/24 11:00





[From [place]]     


 


metoprolol Allergy  Rash/Hives Verified 06/05/24 11:00


 


tramadol Allergy  VERTIGO Verified 06/05/24 11:00


 


Sulfa (Sulfonamide AdvReac  LIGHT Verified 06/05/24 11:00





Antibiotics)   HEADED,  





   VERTIGO  














Review of Systems


ROS Other: All systems not noted in ROS Statement are negative.





<Chaparrita Bustamante - Last Filed: 06/05/24 10:58>


ROS Other: All systems not noted in ROS Statement are negative.





<Parish Yanes - Last Filed: 06/05/24 15:18>


ROS Statement: 


Those systems with pertinent positive or pertinent negative responses have been 

documented in the HPI.








Past Medical History


Past Medical History: Cancer, Hypertension, Skin Disorder


Additional Past Medical History / Comment(s): SKIN CANCER, has a cold sore on 

lip


History of Any Multi-Drug Resistant Organisms: None Reported


Past Surgical History: Joint Replacement, Orthopedic Surgery, Tubal Ligation


Additional Past Surgical History / Comment(s): SURGERY FOR SKIN CANCER-LEG,  HIP

ORIF


Past Anesthesia/Blood Transfusion Reactions: No Reported Reaction


Past Psychological History: No Psychological Hx Reported


Past Alcohol Use History: Daily


Past Drug Use History: None Reported





<Chaparrita Bustamante - Last Filed: 06/05/24 10:58>





General Exam





<Chaparrita Bustamante - Last Filed: 06/05/24 10:58>


General appearance: alert, in no apparent distress


Head exam: Present: atraumatic, normocephalic, normal inspection


Eye exam: Present: normal appearance, PERRL, EOMI.  Absent: scleral icterus, 

conjunctival injection, periorbital swelling


ENT exam: Present: normal exam, mucous membranes moist


Neck exam: Present: normal inspection.  Absent: tenderness, meningismus, 

lymphadenopathy


Respiratory exam: Present: normal lung sounds bilaterally.  Absent: respiratory 

distress, wheezes, rales, rhonchi, stridor


Cardiovascular Exam: Present: regular rate, normal rhythm, normal heart sounds. 

Absent: systolic murmur, diastolic murmur, rubs, gallop, clicks


GI/Abdominal exam: Present: soft, normal bowel sounds.  Absent: distended, 

tenderness, guarding, rebound, rigid


Extremities exam: Present: normal inspection, full ROM, normal capillary refill.

 Absent: tenderness, pedal edema, joint swelling, calf tenderness


Back exam: Present: normal inspection


Neurological exam: Present: alert, oriented X3, CN II-XII intact


Psychiatric exam: Present: normal affect, normal mood


Skin exam: Present: warm, dry, intact, normal color.  Absent: rash





<Parish Yanes - Last Filed: 06/05/24 15:18>





- General Exam Comments


Initial Comments: 


Visual Physical Exam





Vital signs reviewed





General: Well-appearing, nontoxic, no acute distress.


Head: Normocephalic, atraumatic


Eyes: PERRLA, EOMI


ENT: Airway patent


Chest: Nonlabored breathing


Skin: No visual rash, normal skin tone


Neuro: Alert and oriented 3


Musculoskeletal: No gross abnormalities


 (VogleyRonalChaparrita)





Course





<Parish Yanes - Last Filed: 06/05/24 15:18>


                                   Vital Signs











  06/05/24 06/05/24





  10:58 13:32


 


Temperature 98 F 


 


Pulse Rate 68 74


 


Respiratory 18 16





Rate  


 


Blood Pressure 181/87 198/105


 


O2 Sat by Pulse 98 98





Oximetry  














- Reevaluation(s)


Reevaluation #1: 





06/05/24 14:55


Records reviewed (Parish Yanes)


Reevaluation #2: 





06/05/24 14:55


Patient was unchanged (Parish Yanes)


Reevaluation #3: 





06/05/24 14:55


Results and questions answered (Parish Yanes)


Reevaluation #4: 





Was pt. sent in by a medical professional or institution (, PA, NP, urgent 

care, hospital, or nursing home...) When possible be specific


@  -no


Did you speak to anyone other than the patient for history (EMS, parent, family,

police, friend...)? What history was obtained from this source 


@  -no


Did you review nursing and triage notes (agree or disagree)?  Why? 


@  -agree


Are old charts reviewed (outside hosp., previous admission, EMS record, old EKG,

old radiological studies, urgent care reports/EKG's, nursing home records)? 

Report findings 


@  -yes


Differential Diagnosis (chest pain, altered mental status, abdominal pain women,

abdominal pain men, vaginal bleeding, weakness, fever, dyspnea, syncope, 

headache, dizziness, GI bleed, back pain, seizure, CVA, palpatations, mental 

health, musculoskeletal)? 


@  -prior


EKG interpreted by me (3pts min.).


@  -no


X-rays interpreted by me (1pt min.).


@  -yes negative for acute disease


CT interpreted by me (1pt min.).


@  -no


U/S interpreted by me (1pt. min.).


@  -no


What testing was considered but not performed or refused? (CT, X-rays, U/S, 

labs)? Why?


@  -none


What meds were considered but not given or refused? Why?


@  -none


Did you discuss the management of the patient with other professionals 

(professionals i.e. , PA, NP, lab, RT, psych nurse, , , 

teacher, , )? Give summary


@  -no


Was smoking cessation discussed for >3mins.?


@  -no


Were there social determinants of health that impacted care today? How? 

(Homelessness, low income, unemployed, alcoholism, drug addiction, 

transportation, low edu. Level, literacy, decrease access to med. care, residential, 

rehab)?


@  -none


Was there de-escalation of care discussed even if they declined (Discuss DNR or 

withdrawal of care, Hospice)? DNR status


@  -no


What co-morbidities impacted this encounter? (DM, HTN, Smoking, COPD, CAD, 

Cancer, CVA, ARF, Chemo, Hep., AIDS, mental health diagnosis, sleep apnea, 

morbid obesity)?


@  -none


Was patient admitted / discharged? Hospital course, mention meds given and 

route, prescriptions, significant lab abnormalities, going to OR and other 

pertinent info.


@  - 45-year-old male to ER for a couple days of finger pain while playing 

catch, patient does have finger pain and swelling although no significant 

findings of fracture or dislocation.  Patient likely had dislocated and 

relocated digit, patient has no other complaints currently can be discharged 

home


Discharge


Was critical care preformed (if so, how long)?


@  -no


Undiagnosed new problem with uncertain prognosis?


@  -no


Drug Therapy requiring intensive monitoring for toxicity (Heparin, Nitro, 

Insulin, Cardizem)?


@  -no


Were any procedures done?


@  -no


Diagnosis/sy finger sprain, finger pain mptom?


@  -


Acute, or Chronic, or Acute on Chronic?


@  -Acute


Uncomplicated (without systemic symptoms) or Complicated (systemic symptoms)?


@  -Complicated


Side effects of treatment?


@  -no


Exacerbation, Progression, or Severe Exacerbation?


@  -exacerbation


Poses a threat to life or bodily function? How? (Chest pain, USA, MI, pneumonia,

PE, COPD, DKA, ARF, appy, cholecystitis, CVA, Diverticulitis, Homicidal, Suic

idal, threat to staff... and all critical care pts)


@  -no (Parish Yanes)





- Consultations


Consultation #1: 





spoke w Dr Hernandez who agrees to admit this patient (Parish Yanes)





Medical Decision Making





<Chaparrita Bustamante - Last Filed: 06/05/24 10:58>





- Radiology Data


Radiology results: report reviewed (XR Right tib-fib negative for acute 

disease), image reviewed





<Parish Yanes - Last Filed: 06/05/24 15:18>





- Medical Decision Making


I performed the QuickNote portion of this chart. Signed Chaparrita Bustamante PA-C.


 (Chaparrita Bustamante)


79 female found to have right lower extremity hematoma.  This was debrided here 

in the emergency department patient began to feel uncomfortable with procedure 

secondary to possibility of infection refused to have further debrided and here 

in the ER and will and speak with  To her patient will be admitted for 

vascular surgery consultation (Parish Yanes)





Disposition





<Chaparrita Bustamante - Last Filed: 06/05/24 10:58>


Is patient prescribed a controlled substance at d/c from ED?: No


Time of Disposition: 14:00





<Parish Yanes - Last Filed: 06/05/24 15:18>


Clinical Impression: 


 Hematoma of right lower leg, Cellulitis of right leg





Disposition: ADMITTED AS IP TO THIS HOSP


Condition: Fair

## 2024-06-06 VITALS
HEART RATE: 69 BPM | SYSTOLIC BLOOD PRESSURE: 112 MMHG | RESPIRATION RATE: 16 BRPM | TEMPERATURE: 97.6 F | DIASTOLIC BLOOD PRESSURE: 67 MMHG

## 2024-06-06 LAB
ALBUMIN SERPL-MCNC: 4 G/DL (ref 3.8–4.9)
ALBUMIN/GLOB SERPL: 1.9 RATIO (ref 1.6–3.17)
ALP SERPL-CCNC: 89 U/L (ref 41–126)
ALT SERPL-CCNC: 12 U/L (ref 8–44)
ANION GAP SERPL CALC-SCNC: 12.5 MMOL/L (ref 4–12)
AST SERPL-CCNC: 15 U/L (ref 13–35)
BASOPHILS # BLD AUTO: 0.05 X 10*3/UL (ref 0–0.1)
BASOPHILS NFR BLD AUTO: 0.8 %
BUN SERPL-SCNC: 5.2 MG/DL (ref 9–27)
BUN/CREAT SERPL: 10.4 RATIO (ref 12–20)
CALCIUM SPEC-MCNC: 8.9 MG/DL (ref 8.7–10.3)
CHLORIDE SERPL-SCNC: 99 MMOL/L (ref 96–109)
CO2 SERPL-SCNC: 22.5 MMOL/L (ref 21.6–31.8)
EOSINOPHIL # BLD AUTO: 0.13 X 10*3/UL (ref 0.04–0.35)
EOSINOPHIL NFR BLD AUTO: 2 %
ERYTHROCYTE [DISTWIDTH] IN BLOOD BY AUTOMATED COUNT: 3.55 X 10*6/UL (ref 4.1–5.2)
ERYTHROCYTE [DISTWIDTH] IN BLOOD: 13.1 % (ref 11.5–14.5)
GLOBULIN SER CALC-MCNC: 2.1 G/DL (ref 1.6–3.3)
GLUCOSE SERPL-MCNC: 97 MG/DL (ref 70–110)
HCT VFR BLD AUTO: 35.8 % (ref 37.2–46.3)
HGB BLD-MCNC: 11.9 G/DL (ref 12–15)
IMM GRANULOCYTES BLD QL AUTO: 0.2 %
LYMPHOCYTES # SPEC AUTO: 2.7 X 10*3/UL (ref 0.9–5)
LYMPHOCYTES NFR SPEC AUTO: 41.7 %
MAGNESIUM SPEC-SCNC: 1.5 MG/DL (ref 1.5–2.4)
MCH RBC QN AUTO: 33.5 PG (ref 27–32)
MCHC RBC AUTO-ENTMCNC: 33.2 G/DL (ref 32–37)
MCV RBC AUTO: 100.8 FL (ref 80–97)
MONOCYTES # BLD AUTO: 0.85 X 10*3/UL (ref 0.2–1)
MONOCYTES NFR BLD AUTO: 13.1 %
NEUTROPHILS # BLD AUTO: 2.74 X 10*3/UL (ref 1.8–7.7)
NEUTROPHILS NFR BLD AUTO: 42.2 %
NRBC BLD AUTO-RTO: 0 X 10*3/UL (ref 0–0.01)
PLATELET # BLD AUTO: 262 X 10*3/UL (ref 140–440)
POTASSIUM SERPL-SCNC: 4 MMOL/L (ref 3.5–5.5)
PROT SERPL-MCNC: 6.1 G/DL (ref 6.2–8.2)
SODIUM SERPL-SCNC: 134 MMOL/L (ref 135–145)
WBC # BLD AUTO: 6.48 X 10*3/UL (ref 4.5–10)

## 2024-06-06 RX ADMIN — FOLIC ACID SCH MG: 1 TABLET ORAL at 08:54

## 2024-06-06 RX ADMIN — SPIRONOLACTONE SCH MG: 25 TABLET, FILM COATED ORAL at 08:54

## 2024-06-06 RX ADMIN — Medication SCH EACH: at 08:54

## 2024-06-06 RX ADMIN — LOSARTAN POTASSIUM SCH MG: 50 TABLET, FILM COATED ORAL at 06:05

## 2024-06-06 RX ADMIN — ASPIRIN 81 MG CHEWABLE TABLET SCH MG: 81 TABLET CHEWABLE at 08:54

## 2024-06-06 RX ADMIN — I-VITE, TAB 1000-60-2MG (60/BT) SCH EACH: TAB at 08:53

## 2024-06-06 RX ADMIN — ATORVASTATIN CALCIUM SCH MG: 20 TABLET, FILM COATED ORAL at 08:54

## 2024-06-06 RX ADMIN — LIDOCAINE HYDROCHLORIDE STA: 10 INJECTION, SOLUTION INFILTRATION; PERINEURAL at 13:34

## 2024-06-06 RX ADMIN — THERA TABS SCH EACH: TAB at 08:54

## 2024-06-06 RX ADMIN — CHOLECALCIFEROL TAB 125 MCG (5000 UNIT) SCH MCG: 125 TAB at 08:54

## 2024-06-06 NOTE — P.HPIM
History of Present Illness


H&P Date: 24


Chief Complaint: Right leg hematoma.


This is a history and physical as well as discharge summary.








HISTORY OF PRESENT ILLNESS:





This is an 79-year-old  female with a previous medical history 

significant for hypertension and hypertensive cardiovascular disease, hyper

lipidemia, history of osteoarthritis, spondylosis of the lumbar spine thoracic 

spine, history of osteopenia, patient presented to the office because of an 

increased hematoma to the right lower extremity after she was hit by a soccer 

ball, about a week ago she went to [facility] for evaluation, she

did not receive any treatment at that time, she came to the office, and she was 

found to have an increased hematoma to the right shin at the lower aspect, I 

recommended for the patient to have a vascular surgery look at her leg to have 

an I&D as the patient will need to have a wound care after that, but the patient

could not get into the vascular surgery office until the the 18th of this month,

so she was directed by my nurse practitioner to go to the ER for evaluation and 

possible debridement, and follow-up as an outpatient, patient was in the ER, had

minimal debridement in the ER, patient was upset she went to see the vascular 

surgeon, she was admitted to the hospital for evaluation and she was started on 

her medication, she was seen in consultation by the wound care nurse as well as 

by the vascular surgery, who recommended for the patient to follow-up as an 

outpatient in the wound care center.  No intervention will be done today.


REVIEW OF SYSTEMS:





Constitutional: No documented fever, no chills, no night sweats.  No weight 

change.  No weakness, fatigue or lethargy.  No daytime sleepiness.


EENT: No headache.  No blurred vision or double vision, no loss of vision.  No 

loss of Hearing, no ringing in the ears, no dizziness.  No nasal drainage or 

congestion.  No epistaxis.  No sore throat.


Lungs: No shortness of breath, no cough, no sputum production.  No wheezing.  

Reports dyspnea with activity.


Cardiovascular: No chest pain, no lower extremity edema.  No palpitations.  No 

paroxysmal nocturnal dyspnea.  No orthopnea.  No lightheadedness or dizziness.  

No syncopal episodes.


Abdominal: Reports no abdominal pain.  No nausea, vomiting.  No diarrhea.  No 

constipation.  No bloody or tarry stools reports loss of appetite.


Genitourinary: No dysuria, increased frequency, urgency.  No urinary retention.


Musculoskeletal: No myalgias.  No muscle weakness, no gait dysfunction, no 

frequent falls.  No back pain.  No neck pain.


Integumentary: right leg with denuded hemorrhagic blister elongated with black 

eschar partially excised, no lesions.  No rash or pruritus.  No unusual 

bruising.  No change in hair or nails.


Neurologic: No aphasia. No facial droop. No change in mentation. No head injury.

No headache. No paralysis. No paresthesia.


Psychiatric: No depression.  No anxiety.  No mood swings.


Endocrine: No abnormal blood sugars.  No weight change.  





PAST MEDICAL HISTORY:





Hypertension and hypertensive cardiovascular disease.


Hyperlipidemia.


Osteoarthritis.


Osteoporosis


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


Skin cancer.


Mild Aortic valve stenosis





PAST SURGICAL HISTORY:





Bilateral total knee arthroplasty.


Right hip IM nailing.








SOCIAL HISTORY:





Patient is a lifelong non-smoker, she drinks alcohol socially, she denies any 

drug use or abuse, she lives with her .





FAMILY HISTORY: 





Patient did not know much about her father, her mother  at age of 90 she had

history of hypertension hyperlipidemia, she  from old age, patient had 5 

brothers one of her brothers  from cardiac arrest had history of atrial 

fibrillation hypertension and chronic kidney disease, patient had 1 sister who 

 as well patient has 2 daughters one of them with non-Hodgkin lymphoma and 

and she is recently diagnosed with bladder cancer.





PHYSICAL EXAMINATION:





General: This is a an 79-year-old  female who is laying down in bed in 

no apparent distress.


HEENT: Head is atraumatic, normocephalic, pupils were equal round reactive to 

light and recommendation, extraocular muscle movement were intact, sclera 

nonicteric, conjunctivae were pale, mucous membranes of the mouth are somewhat 

dry.


Neck: Supple, no JVP, normal carotid upstroke bilaterally, no lymphadenopathy.


Chest: Decreased breath sounds at the bases, few rhonchi, no expiratory wheezes,

no chest wall tenderness, no intercostal retractions.


Heart: First heart sound is normal, second heart sound is normal there is 

systolic ejection murmur 2/6 located in the left sternal border.


Abdomen: Soft, nontender, nondistended, positive bowel sounds.


Extremities: There is no edema no calf tenderness DP +2 bilaterally, there is an

denuded hemorrhagic blister from the lower aspect of the right leg with an 

eschar on top of it.


Neurologic examination: Patient is awake alert and oriented x3, cranial nerves 

II-12 appear grossly intact, muscle power were 5 out of 5 in upper extremities 

and 5 out of 5 in bilateral lower extremities, deep tendon reflexes normal 

bilaterally.





ASSESSMENT AND PLAN:





1. Right lower extremity hematoma status post I&D that was done in the emergency

department.  Patient was seen and evaluated by vascular surgery as well as by 

the wound clinic, patient will be started on oral antibiotic in the form of 

cephalexin 500 mg orally 3 times every day for 10 days, she will follow-up with 

us as an outpatient in the office in a week from now, she will follow-up with 

vascular surgery as well as the wound clinic on a regular basis, until the wound

is healed.  No need for any further intervention at this point in time.





2.  Hypertension and hypertensive cardiovascular disease.  Continue patient on 

carvedilol 37.5 mg orally twice every day, continue patient on losartan 100 mg 

orally once every day, continue spironolactone 12.5 mg orally once every day.  

Monitor the patient blood pressure very closely.





3.  Mixed hyperlipidemia.  Continue patient on rosuvastatin 10 mg once every 

day, monitor the patient lipid panel, keep LDL 55-70.





4.  Osteopenia.  Continue patient on alendronate 70 mg once every week, continue

calcium 1200 mg once every day, continue vitamin D3 2000 unit once every day.  

Patient is up-to-date on her DEXA scan.





5.  Vitamin D deficiency.  Continue vitamin D3 2000 units once every day.





6.  Osteoarthritis.  Continue current pain management.





7.  DVT prophylaxis.  Early ambulation.





8.  GI prophylaxis.  Not needed.





9.  Patient is medically stable to be discharged home she will follow-up with 

vascular surgery as an outpatient, she will follow-up with wound care healing 

center at [facility], I will see the patient in the office in a week 

from now.





Past Medical History


Past Medical History: Cancer, Hypertension, Skin Disorder


Additional Past Medical History / Comment(s): SKIN CANCER, has a cold sore on 

lip


History of Any Multi-Drug Resistant Organisms: None Reported


Past Surgical History: Joint Replacement, Orthopedic Surgery, Tubal Ligation


Additional Past Surgical History / Comment(s): SURGERY FOR SKIN CANCER-LEG,  HIP

ORIF, femur fx, bilateral knees replaced


Past Anesthesia/Blood Transfusion Reactions: No Reported Reaction


Past Psychological History: No Psychological Hx Reported


Smoking Status: Never smoker


Past Alcohol Use History: Daily


Past Drug Use History: None Reported





Medications and Allergies


                                Home Medications











 Medication  Instructions  Recorded  Confirmed  Type


 


Aspirin 81 mg PO DAILY 14 History


 


Multivitamin/Iron/Folic Acid 1 tab PO DAILY 14 History





[Centrum Complete Multivit Tab]    


 


Calcium Carbonate/Vitamin D3 1 tab PO BID 14 History





[Caltrate 600 Plus D3 Tablet]    


 


Folic Acid 800 mg PO DAILY 14 History


 


Losartan Potassium 100 mg PO QAM 14 History


 


Alendronate Sodium [Fosamax] 70 mg PO JOHNSON 24 History


 


Cholecalciferol [Vitamin D3 (125 125 mcg PO DAILY 24 History





Mcg = 5000 Iu)]    


 


Rosuvastatin [Crestor] 10 mg PO DAILY 24 History


 


Spironolactone 12.5 mg PO DAILY 24 History


 


Vit C/E/Zn/Coppr/Lutein/Zeaxan 1 cap PO DAILY 24 History





[Preservision Areds 2 Softgel]    


 


carvediloL [Coreg] 37.5 mg PO BID 24 History


 


Cephalexin [Keflex] 500 mg PO TID 1 Days #30 cap 24  Rx








                                    Allergies











Allergy/AdvReac Type Severity Reaction Status Date / Time


 


codeine Allergy  Rash/Hives Verified 24 15:30


 


hydrocodone bitartrate Allergy  Itching Verified 24 15:30





[From [place]]     


 


metoprolol Allergy  Rash/Hives Verified 24 15:30


 


Sulfa (Sulfonamide AdvReac  LIGHT Verified 24 15:30





Antibiotics)   HEADED,  





   VERTIGO  


 


sulfamethoxazole AdvReac  LIGHT Verified 24 15:30





[From Bactrim]   HEADED,  





   VERTIGO  


 


tramadol AdvReac  VERTIGO Verified 24 15:30


 


trimethoprim [From Bactrim] AdvReac  LIGHT Verified 24 15:30





   HEADED,  





   VERTIGO  














Physical Exam


Vitals: 


                                   Vital Signs











  Temp Pulse Pulse Pulse Resp BP BP


 


 24 15:00  97.6 F    69  16   112/67


 


 24 07:00  98.2 F    72  18   161/82


 


 24 02:23  97.8 F   71   16   181/96


 


 24 21:26  97.9 F   68   16   177/78


 


 24 20:50  97.7 F  72    18  179/81 


 


 24 19:08   63    18  124/71 














  Pulse Ox


 


 24 15:00  96


 


 24 07:00  96


 


 24 02:23  96


 


 24 21:26  96


 


 24 20:50  96


 


 24 19:08  97








                                Intake and Output











 24





 06:59 14:59 22:59


 


Intake Total  118 


 


Balance  118 


 


Intake:   


 


  Oral  118 


 


Other:   


 


  # Voids 3 2 














Results


CBC & Chem 7: 


                                 24 04:28





                                 24 04:28


Labs: 


                  Abnormal Lab Results - Last 24 Hours (Table)











  24 Range/Units





  04:28 04:28 


 


RBC  3.55 L   (4.10-5.20)  X 10*6/uL


 


Hgb  11.9 L   (12.0-15.0)  g/dL


 


Hct  35.8 L   (37.2-46.3)  %


 


MCV  100.8 H   (80.0-97.0)  FL


 


MCH  33.5 H   (27.0-32.0)  pg


 


Sodium   134 L  (135-145)  mmol/L


 


Anion Gap   12.50 H  (4.00-12.00)  mmol/L


 


BUN   5.2 L  (9.0-27.0)  mg/dL


 


Creatinine   0.5 L  (0.6-1.5)  mg/dL


 


BUN/Creatinine Ratio   10.40 L  (12.00-20.00)  Ratio


 


Total Protein   6.1 L  (6.2-8.2)  g/dL














Thrombosis Risk Factor Assmnt





- Choose All That Apply


Any of the Below Risk Factors Present?: No


Other Risk Factors: Yes


Each Risk Factor Represents 3 Points: Age 75 years or older


Other congenital or acquired thrombophilia - If yes, enter type in comment: No


Thrombosis Risk Factor Assessment Total Risk Factor Score: 3


Thrombosis Risk Factor Assessment Level: Moderate Risk

## 2024-06-06 NOTE — P.GSCN
History of Present Illness


Consult date: 06/06/24


Reason for Consult: 





Right shin hematoma


Requesting physician: Parish Yanes


History of present illness: 





This is a very pleasant 79-year-old female who presented to the emergency 

department yesterday after seeing her primary care physician for concerns of the

hematoma on her leg that was felt needed to be drained.  Apparently about 2 

weeks ago patient was hit in the knee by a soccer ball.  At that time she de

veloped an instant bruising and some swelling.  She had a small hematoma and 

went to [facility] to be seen.  The did not believe that she 

needed any intervention.  She continued to have swelling in that area with 

bruising and redness and went back to see her PCP.  She does state there is some

discomfort to that area and some numbness and tingling.  She denies any 

anticoagulation, does take a aspirin 81 mg daily.


Apparently while the patient was in the emergency department in the ER physician

had started to attempt I&D evacuation of the hematoma however the patient felt 

uncomfortable because she was in the hallway and asked him to stop.  Vascular 

surgery was consulted for further evaluation.





Review of Systems





A 14 point review systems was completed all pertinent positives and negatives as

stated in the HPI.





Past Medical History


Past Medical History: Cancer, Hypertension, Skin Disorder


Additional Past Medical History / Comment(s): SKIN CANCER, has a cold sore on 

lip


History of Any Multi-Drug Resistant Organisms: None Reported


Past Surgical History: Joint Replacement, Orthopedic Surgery, Tubal Ligation


Additional Past Surgical History / Comment(s): SURGERY FOR SKIN CANCER-LEG,  HIP

ORIF, femur fx, bilateral knees replaced


Past Anesthesia/Blood Transfusion Reactions: No Reported Reaction


Past Psychological History: No Psychological Hx Reported


Smoking Status: Never smoker


Past Alcohol Use History: Daily


Past Drug Use History: None Reported





Medications and Allergies


                                Home Medications











 Medication  Instructions  Recorded  Confirmed  Type


 


Aspirin 81 mg PO DAILY 05/13/14 06/05/24 History


 


Multivitamin/Iron/Folic Acid 1 tab PO DAILY 05/13/14 06/05/24 History





[Centrum Complete Multivit Tab]    


 


Calcium Carbonate/Vitamin D3 1 tab PO BID 06/11/14 06/05/24 History





[Caltrate 600 Plus D3 Tablet]    


 


Folic Acid 800 mg PO DAILY 06/12/14 06/05/24 History


 


Losartan Potassium 100 mg PO QAM 11/24/14 06/05/24 History


 


Alendronate Sodium [Fosamax] 70 mg PO JOHNSON 06/05/24 06/05/24 History


 


Cholecalciferol [Vitamin D3 (125 125 mcg PO DAILY 06/05/24 06/05/24 History





Mcg = 5000 Iu)]    


 


Rosuvastatin [Crestor] 10 mg PO DAILY 06/05/24 06/05/24 History


 


Spironolactone 12.5 mg PO DAILY 06/05/24 06/05/24 History


 


Vit C/E/Zn/Coppr/Lutein/Zeaxan 1 cap PO DAILY 06/05/24 06/05/24 History





[Preservision Areds 2 Softgel]    


 


carvediloL [Coreg] 37.5 mg PO BID 06/05/24 06/05/24 History


 


Cephalexin [Keflex] 500 mg PO TID 1 Days #30 cap 06/06/24  Rx








                                    Allergies











Allergy/AdvReac Type Severity Reaction Status Date / Time


 


codeine Allergy  Rash/Hives Verified 06/05/24 15:30


 


hydrocodone bitartrate Allergy  Itching Verified 06/05/24 15:30





[From [place]]     


 


metoprolol Allergy  Rash/Hives Verified 06/05/24 15:30


 


Sulfa (Sulfonamide AdvReac  LIGHT Verified 06/05/24 15:30





Antibiotics)   HEADED,  





   VERTIGO  


 


sulfamethoxazole AdvReac  LIGHT Verified 06/05/24 15:30





[From Bactrim]   HEADED,  





   VERTIGO  


 


tramadol AdvReac  VERTIGO Verified 06/05/24 15:30


 


trimethoprim [From Bactrim] AdvReac  LIGHT Verified 06/05/24 15:30





   HEADED,  





   VERTIGO  














Surgical - Exam


                                   Vital Signs











Temp Pulse Resp BP Pulse Ox


 


 98 F   68   18   181/87   98 


 


 06/05/24 10:58  06/05/24 10:58  06/05/24 10:58  06/05/24 10:58  06/05/24 10:58














General appearance: The patient is alert, oriented, appears in no acute 

distress.


HET: Head is normocephalic and atraumatic.  


Neck: Supple.


Heart: Regular.


Lungs: Equal expansion, normal respiratory effort.


Abdomen: Soft, nondistended.


Extremities: Bilateral lower extremities with nonpitting edema.  Right shin with

evacuated hematoma with 7 x 2-1/2 cm wound with eschar and necrotic tissue 

present.  Some mild erythema surrounding.  Palpable bilateral pedal pulses.


Neurological: No focal deficits.  Strength and sensation are grossly intact.





Results





- Labs





                                 06/06/24 04:28





                                 06/06/24 04:28


                  Abnormal Lab Results - Last 24 Hours (Table)











  06/06/24 06/06/24 Range/Units





  04:28 04:28 


 


RBC  3.55 L   (4.10-5.20)  X 10*6/uL


 


Hgb  11.9 L   (12.0-15.0)  g/dL


 


Hct  35.8 L   (37.2-46.3)  %


 


MCV  100.8 H   (80.0-97.0)  FL


 


MCH  33.5 H   (27.0-32.0)  pg


 


Sodium   134 L  (135-145)  mmol/L


 


Anion Gap   12.50 H  (4.00-12.00)  mmol/L


 


BUN   5.2 L  (9.0-27.0)  mg/dL


 


Creatinine   0.5 L  (0.6-1.5)  mg/dL


 


BUN/Creatinine Ratio   10.40 L  (12.00-20.00)  Ratio


 


Total Protein   6.1 L  (6.2-8.2)  g/dL








                                 Diabetes panel











  06/06/24 Range/Units





  04:28 


 


Sodium  134 L  (135-145)  mmol/L


 


Potassium  4.0  (3.5-5.5)  mmol/L


 


Chloride  99  ()  mmol/L


 


Carbon Dioxide  22.5  (21.6-31.8)  mmol/L


 


BUN  5.2 L  (9.0-27.0)  mg/dL


 


Creatinine  0.5 L  (0.6-1.5)  mg/dL


 


Glucose  97  ()  mg/dL


 


Calcium  8.9  (8.7-10.3)  mg/dL


 


AST  15  (13-35)  U/L


 


ALT  12  (8-44)  U/L


 


Alkaline Phosphatase  89  ()  U/L


 


Total Protein  6.1 L  (6.2-8.2)  g/dL


 


Albumin  4.0  (3.8-4.9)  g/dL








                                  Calcium panel











  06/06/24 Range/Units





  04:28 


 


Calcium  8.9  (8.7-10.3)  mg/dL


 


Phosphorus  3.0  (2.4-5.1)  mg/dL


 


Albumin  4.0  (3.8-4.9)  g/dL








                                 Pituitary panel











  06/06/24 Range/Units





  04:28 


 


Sodium  134 L  (135-145)  mmol/L


 


Potassium  4.0  (3.5-5.5)  mmol/L


 


Chloride  99  ()  mmol/L


 


Carbon Dioxide  22.5  (21.6-31.8)  mmol/L


 


BUN  5.2 L  (9.0-27.0)  mg/dL


 


Creatinine  0.5 L  (0.6-1.5)  mg/dL


 


Glucose  97  ()  mg/dL


 


Calcium  8.9  (8.7-10.3)  mg/dL








                                  Adrenal panel











  06/06/24 Range/Units





  04:28 


 


Sodium  134 L  (135-145)  mmol/L


 


Potassium  4.0  (3.5-5.5)  mmol/L


 


Chloride  99  ()  mmol/L


 


Carbon Dioxide  22.5  (21.6-31.8)  mmol/L


 


BUN  5.2 L  (9.0-27.0)  mg/dL


 


Creatinine  0.5 L  (0.6-1.5)  mg/dL


 


Glucose  97  ()  mg/dL


 


Calcium  8.9  (8.7-10.3)  mg/dL


 


Total Bilirubin  0.5  (0.3-1.2)  mg/dL


 


AST  15  (13-35)  U/L


 


ALT  12  (8-44)  U/L


 


Alkaline Phosphatase  89  ()  U/L


 


Total Protein  6.1 L  (6.2-8.2)  g/dL


 


Albumin  4.0  (3.8-4.9)  g/dL














Assessment and Plan


Assessment: 





1.  Right lower extremity hematoma resulting from injury status post evacuation


2.  Ulceration with fat layer exposed right lower extremity


Plan: 





1.  There is no indication for any I&D of hematoma


2.  Consult to wound care for local wound care, outpatient wound care will be 

needed


3.  May follow-up with vascular surgery for follow-up with vascular surgery for 

further evaluation of possible venous insufficiency


Thank you for this consultation, patient is cleared from vascular surgery for 

discharge.








The impression and plan of care has been dictated as directed.








I performed a history and examination of this patient,  discussed the same with 

the dictator.  I agree with the dictator's note ,documented as a scribe.  Any 

additional findings or plans will be noted.

## 2024-06-06 NOTE — P.CONS
History of Present Illness





- Reason for Consult


Consult date: 06/06/24


wound care





- History of Present Illness


This is a 79-year-old being seen on 6 N. for a right lower extremity hematoma.  

Patient had the area evacuated in the ER however she continues to have 

significant amount of eschar and necrotic tissue present.  Edema is noted to the

site.





Review Of Systems:


Constitutional: No fever, no chills, no night sweats.  No weight change.  No 

weakness, fatigue or lethargy.  No daytime sleepiness.


Integumentary:reports wounds, no lesions.  No rash or pruritus.  No unusual 

bruising.  No change in hair or nails.





Physical exam:


General Appearance: Alert, cooperative, no distress, appears stated age.


Skin: See HPI all other Skin color, texture, tugor normal, no rashes or lesions.


Neurologic: Alert oriented x3 





Assessment:


1.  Nonhealing ulceration with fat layer exposure right lower extremity


2.  Hematoma





Plan:


1.Apply 1/2 strength hydrogen perioxide and normal saline. Flush the site. Apply

wet to dry dressing to site and wrap with ace wrap. Patient would like home 

care.  Plan is to see the patient early next week or the week after for a wound 

care appointment.





Thank you for the consultation any questions please contact the wound care 

center





DNP note has been reviewed and discussed with Dr. Russell and the impression 

and plan of care has been directed as dictated. 








Past Medical History


Past Medical History: Cancer, Hypertension, Skin Disorder


Additional Past Medical History / Comment(s): SKIN CANCER, has a cold sore on 

lip


History of Any Multi-Drug Resistant Organisms: None Reported


Past Surgical History: Joint Replacement, Orthopedic Surgery, Tubal Ligation


Additional Past Surgical History / Comment(s): SURGERY FOR SKIN CANCER-LEG,  HIP

ORIF, femur fx, bilateral knees replaced


Past Anesthesia/Blood Transfusion Reactions: No Reported Reaction


Past Psychological History: No Psychological Hx Reported


Smoking Status: Never smoker


Past Alcohol Use History: Daily


Past Drug Use History: None Reported





Medications and Allergies


                                Home Medications











 Medication  Instructions  Recorded  Confirmed  Type


 


Aspirin 81 mg PO DAILY 05/13/14 06/05/24 History


 


Multivitamin/Iron/Folic Acid 1 tab PO DAILY 05/13/14 06/05/24 History





[Centrum Complete Multivit Tab]    


 


Calcium Carbonate/Vitamin D3 1 tab PO BID 06/11/14 06/05/24 History





[Caltrate 600 + D Tablet]    


 


Folic Acid 800 mg PO DAILY 06/12/14 06/05/24 History


 


Losartan Potassium 100 mg PO QAM 11/24/14 06/05/24 History


 


Alendronate Sodium [Fosamax] 70 mg PO JOHNSON 06/05/24 06/05/24 History


 


Cholecalciferol [Vitamin D3 (125 125 mcg PO DAILY 06/05/24 06/05/24 History





Mcg = 5000 Iu)]    


 


Rosuvastatin [Crestor] 10 mg PO DAILY 06/05/24 06/05/24 History


 


Spironolactone 12.5 mg PO DAILY 06/05/24 06/05/24 History


 


Vit C/E/Zn/Coppr/Lutein/Zeaxan 1 cap PO DAILY 06/05/24 06/05/24 History





[Preservision Areds 2 Softgel]    


 


carvediloL [Coreg] 37.5 mg PO BID 06/05/24 06/05/24 History








                                    Allergies











Allergy/AdvReac Type Severity Reaction Status Date / Time


 


codeine Allergy  Rash/Hives Verified 06/05/24 15:30


 


hydrocodone bitartrate Allergy  Itching Verified 06/05/24 15:30





[From [place]]     


 


metoprolol Allergy  Rash/Hives Verified 06/05/24 15:30


 


Sulfa (Sulfonamide AdvReac  LIGHT Verified 06/05/24 15:30





Antibiotics)   HEADED,  





   VERTIGO  


 


sulfamethoxazole AdvReac  LIGHT Verified 06/05/24 15:30





[From Bactrim]   HEADED,  





   VERTIGO  


 


tramadol AdvReac  VERTIGO Verified 06/05/24 15:30


 


trimethoprim [From Bactrim] AdvReac  LIGHT Verified 06/05/24 15:30





   HEADED,  





   VERTIGO  














Physical Exam


Vitals: 


                                   Vital Signs











  Temp Pulse Pulse Pulse Resp BP BP


 


 06/06/24 07:00  98.2 F    72  18   161/82


 


 06/06/24 02:23  97.8 F   71   16   181/96


 


 06/05/24 21:26  97.9 F   68   16   177/78


 


 06/05/24 20:50  97.7 F  72    18  179/81 


 


 06/05/24 19:08   63    18  124/71 


 


 06/05/24 16:41  97.8 F  69    16  197/76 


 


 06/05/24 13:32   74    16  198/105 














  Pulse Ox


 


 06/06/24 07:00  96


 


 06/06/24 02:23  96


 


 06/05/24 21:26  96


 


 06/05/24 20:50  96


 


 06/05/24 19:08  97


 


 06/05/24 16:41  97


 


 06/05/24 13:32  98








                                Intake and Output











 06/05/24 06/06/24 06/06/24





 22:59 06:59 14:59


 


Other:   


 


  # Voids 1 3 


 


  Weight 63.503 kg  














Results


CBC & Chem 7: 


                                 06/06/24 04:28





                                 06/06/24 04:28


Labs: 


                  Abnormal Lab Results - Last 24 Hours (Table)











  06/06/24 06/06/24 Range/Units





  04:28 04:28 


 


RBC  3.55 L   (4.10-5.20)  X 10*6/uL


 


Hgb  11.9 L   (12.0-15.0)  g/dL


 


Hct  35.8 L   (37.2-46.3)  %


 


MCV  100.8 H   (80.0-97.0)  FL


 


MCH  33.5 H   (27.0-32.0)  pg


 


Sodium   134 L  (135-145)  mmol/L


 


Anion Gap   12.50 H  (4.00-12.00)  mmol/L


 


BUN   5.2 L  (9.0-27.0)  mg/dL


 


Creatinine   0.5 L  (0.6-1.5)  mg/dL


 


BUN/Creatinine Ratio   10.40 L  (12.00-20.00)  Ratio


 


Total Protein   6.1 L  (6.2-8.2)  g/dL














Assessment and Plan


(1) Non-pressure ulcer of right lower extremity with fat layer exposed


Current Visit: Yes   Status: Acute   Code(s): L97.912 - NON-PRS CHR ULC UNSP PRT

OF R LOW LEG W FAT LAYER EXPOSED   SNOMED Code(s): 28123528


   





(2) Hematoma of right lower leg


Current Visit: Yes   Status: Acute   Code(s): S80.11XA - CONTUSION OF RIGHT 

LOWER LEG, INITIAL ENCOUNTER   SNOMED Code(s): 21237334875636093

## 2024-07-08 ENCOUNTER — HOSPITAL ENCOUNTER (INPATIENT)
Dept: HOSPITAL 47 - EC | Age: 79
LOS: 7 days | Discharge: HOME | DRG: 372 | End: 2024-07-15
Attending: INTERNAL MEDICINE | Admitting: INTERNAL MEDICINE
Payer: MEDICARE

## 2024-07-08 VITALS — BODY MASS INDEX: 26.7 KG/M2

## 2024-07-08 DIAGNOSIS — Z79.83: ICD-10-CM

## 2024-07-08 DIAGNOSIS — E55.9: ICD-10-CM

## 2024-07-08 DIAGNOSIS — I95.9: ICD-10-CM

## 2024-07-08 DIAGNOSIS — Z85.828: ICD-10-CM

## 2024-07-08 DIAGNOSIS — M54.81: ICD-10-CM

## 2024-07-08 DIAGNOSIS — I11.9: ICD-10-CM

## 2024-07-08 DIAGNOSIS — E87.70: ICD-10-CM

## 2024-07-08 DIAGNOSIS — Z96.653: ICD-10-CM

## 2024-07-08 DIAGNOSIS — M47.816: ICD-10-CM

## 2024-07-08 DIAGNOSIS — Z88.5: ICD-10-CM

## 2024-07-08 DIAGNOSIS — I35.0: ICD-10-CM

## 2024-07-08 DIAGNOSIS — Z79.82: ICD-10-CM

## 2024-07-08 DIAGNOSIS — E78.2: ICD-10-CM

## 2024-07-08 DIAGNOSIS — A04.71: Primary | ICD-10-CM

## 2024-07-08 DIAGNOSIS — M81.0: ICD-10-CM

## 2024-07-08 DIAGNOSIS — Z79.899: ICD-10-CM

## 2024-07-08 DIAGNOSIS — E66.9: ICD-10-CM

## 2024-07-08 DIAGNOSIS — E86.1: ICD-10-CM

## 2024-07-08 DIAGNOSIS — M47.814: ICD-10-CM

## 2024-07-08 DIAGNOSIS — Z86.19: ICD-10-CM

## 2024-07-08 DIAGNOSIS — E87.1: ICD-10-CM

## 2024-07-08 DIAGNOSIS — Z88.2: ICD-10-CM

## 2024-07-08 DIAGNOSIS — M19.90: ICD-10-CM

## 2024-07-08 LAB
ALBUMIN SERPL-MCNC: 3.7 G/DL (ref 3.5–5)
ALP SERPL-CCNC: 75 U/L (ref 38–126)
ALT SERPL-CCNC: 14 U/L (ref 4–34)
ANION GAP SERPL CALC-SCNC: 9 MMOL/L
APTT BLD: 24.6 SEC (ref 22–30)
AST SERPL-CCNC: 20 U/L (ref 14–36)
BASOPHILS # BLD AUTO: 0 K/UL (ref 0–0.2)
BASOPHILS NFR BLD AUTO: 0 %
BUN SERPL-SCNC: 8 MG/DL (ref 7–17)
CALCIUM SPEC-MCNC: 9.7 MG/DL (ref 8.4–10.2)
CHLORIDE SERPL-SCNC: 96 MMOL/L (ref 98–107)
CO2 SERPL-SCNC: 22 MMOL/L (ref 22–30)
EOSINOPHIL # BLD AUTO: 0.1 K/UL (ref 0–0.7)
EOSINOPHIL NFR BLD AUTO: 2 %
ERYTHROCYTE [DISTWIDTH] IN BLOOD BY AUTOMATED COUNT: 2.56 M/UL (ref 3.8–5.4)
ERYTHROCYTE [DISTWIDTH] IN BLOOD: 12.3 % (ref 11.5–15.5)
GLUCOSE SERPL-MCNC: 107 MG/DL (ref 74–99)
HCT VFR BLD AUTO: 26.6 % (ref 34–46)
HGB BLD-MCNC: 9.9 GM/DL (ref 11.4–16)
INR PPP: 1.1 (ref ?–1.2)
LYMPHOCYTES # SPEC AUTO: 2.5 K/UL (ref 1–4.8)
LYMPHOCYTES NFR SPEC AUTO: 34 %
MAGNESIUM SPEC-SCNC: 1.2 MG/DL (ref 1.6–2.3)
MCH RBC QN AUTO: 38.7 PG (ref 25–35)
MCHC RBC AUTO-ENTMCNC: 37.4 G/DL (ref 31–37)
MCV RBC AUTO: 103.7 FL (ref 80–100)
MONOCYTES # BLD AUTO: 0.9 K/UL (ref 0–1)
MONOCYTES NFR BLD AUTO: 12 %
NEUTROPHILS # BLD AUTO: 3.5 K/UL (ref 1.3–7.7)
NEUTROPHILS NFR BLD AUTO: 48 %
PLATELET # BLD AUTO: 240 K/UL (ref 150–450)
POTASSIUM SERPL-SCNC: 4.5 MMOL/L (ref 3.5–5.1)
PROT SERPL-MCNC: 6.2 G/DL (ref 6.3–8.2)
PT BLD: 11.4 SEC (ref 10–12.5)
SODIUM SERPL-SCNC: 127 MMOL/L (ref 137–145)
WBC # BLD AUTO: 7.2 K/UL (ref 3.8–10.6)

## 2024-07-08 PROCEDURE — 80048 BASIC METABOLIC PNL TOTAL CA: CPT

## 2024-07-08 PROCEDURE — 86140 C-REACTIVE PROTEIN: CPT

## 2024-07-08 PROCEDURE — 81001 URINALYSIS AUTO W/SCOPE: CPT

## 2024-07-08 PROCEDURE — 87040 BLOOD CULTURE FOR BACTERIA: CPT

## 2024-07-08 PROCEDURE — 80053 COMPREHEN METABOLIC PANEL: CPT

## 2024-07-08 PROCEDURE — 84484 ASSAY OF TROPONIN QUANT: CPT

## 2024-07-08 PROCEDURE — 84100 ASSAY OF PHOSPHORUS: CPT

## 2024-07-08 PROCEDURE — 85730 THROMBOPLASTIN TIME PARTIAL: CPT

## 2024-07-08 PROCEDURE — 85610 PROTHROMBIN TIME: CPT

## 2024-07-08 PROCEDURE — 36415 COLL VENOUS BLD VENIPUNCTURE: CPT

## 2024-07-08 PROCEDURE — 87493 C DIFF AMPLIFIED PROBE: CPT

## 2024-07-08 PROCEDURE — 83735 ASSAY OF MAGNESIUM: CPT

## 2024-07-08 PROCEDURE — 99285 EMERGENCY DEPT VISIT HI MDM: CPT

## 2024-07-08 PROCEDURE — 74177 CT ABD & PELVIS W/CONTRAST: CPT

## 2024-07-08 PROCEDURE — 83605 ASSAY OF LACTIC ACID: CPT

## 2024-07-08 PROCEDURE — 93005 ELECTROCARDIOGRAM TRACING: CPT

## 2024-07-08 PROCEDURE — 85025 COMPLETE CBC W/AUTO DIFF WBC: CPT

## 2024-07-08 RX ADMIN — CHOLESTYRAMINE SCH: 4 POWDER, FOR SUSPENSION ORAL at 22:44

## 2024-07-08 RX ADMIN — FAMOTIDINE SCH MG: 20 TABLET, FILM COATED ORAL at 21:51

## 2024-07-08 RX ADMIN — Medication SCH: at 22:45

## 2024-07-08 RX ADMIN — CEFAZOLIN SCH MLS/HR: 330 INJECTION, POWDER, FOR SOLUTION INTRAMUSCULAR; INTRAVENOUS at 21:49

## 2024-07-08 RX ADMIN — LOSARTAN POTASSIUM SCH: 50 TABLET, FILM COATED ORAL at 21:52

## 2024-07-08 RX ADMIN — ACETAMINOPHEN PRN MG: 325 TABLET, FILM COATED ORAL at 23:33

## 2024-07-08 RX ADMIN — VANCOMYCIN HYDROCHLORIDE SCH: 125 CAPSULE ORAL at 22:47

## 2024-07-08 NOTE — ED
General Adult HPI





- General


Chief complaint: Weakness


Stated complaint: cdiff


Time Seen by Provider: 07/08/24 16:44


Source: patient, family, RN/MD, RN notes reviewed


Mode of arrival: ambulatory


Limitations: no limitations





- History of Present Illness


Initial comments: 





Patient is a pleasant 79-year-old female present to the emergency department 

with concern for C. difficile.  Patient was diagnosed with this weeks ago.  

Patient was admitted mid June in [facility].  Patient was discharged 

however and feels no better.  Patient is having persistent diarrhea.  Some 

decreased oral intake.  Some abdominal cramping.  No fevers.  Patient did have 

hyponatremia when she was previously admitted.  Patient was sent over by Dr. Dagher.





- Related Data


                                Home Medications











 Medication  Instructions  Recorded  Confirmed


 


Aspirin 81 mg PO DAILY 05/13/14 06/05/24


 


Multivitamin/Iron/Folic Acid 1 tab PO DAILY 05/13/14 06/05/24





[Centrum Complete Multivit Tab]   


 


Calcium Carbonate/Vitamin D3 1 tab PO BID 06/11/14 06/05/24





[Caltrate 600 Plus D3 Tablet]   


 


Folic Acid 800 mg PO DAILY 06/12/14 06/05/24


 


Losartan Potassium 100 mg PO QAM 11/24/14 06/05/24


 


Alendronate Sodium [Fosamax] 70 mg PO JOHNSON 06/05/24 06/05/24


 


Cholecalciferol [Vitamin D3 (125 125 mcg PO DAILY 06/05/24 06/05/24





Mcg = 5000 Iu)]   


 


Rosuvastatin [Crestor] 10 mg PO DAILY 06/05/24 06/05/24


 


Spironolactone 12.5 mg PO DAILY 06/05/24 06/05/24


 


Vit C/E/Zn/Coppr/Lutein/Zeaxan 1 cap PO DAILY 06/05/24 06/05/24





[Preservision Areds 2 Softgel]   


 


carvediloL [Coreg] 37.5 mg PO BID 06/05/24 06/05/24








                                  Previous Rx's











 Medication  Instructions  Recorded


 


Cephalexin [Keflex] 500 mg PO TID 1 Days #30 cap 06/06/24











                                    Allergies











Allergy/AdvReac Type Severity Reaction Status Date / Time


 


codeine Allergy  Rash/Hives Verified 07/08/24 16:46


 


hydrocodone bitartrate Allergy  Itching Verified 07/08/24 16:46





[From [place]u]     


 


metoprolol Allergy  Rash/Hives Verified 07/08/24 16:46


 


Sulfa (Sulfonamide AdvReac  LIGHT Verified 07/08/24 16:46





Antibiotics)   HEADED,  





   VERTIGO  


 


sulfamethoxazole AdvReac  LIGHT Verified 07/08/24 16:46





[From Bactrim]   HEADED,  





   VERTIGO  


 


tramadol AdvReac  VERTIGO Verified 07/08/24 16:46


 


trimethoprim [From Bactrim] AdvReac  LIGHT Verified 07/08/24 16:46





   HEADED,  





   VERTIGO  














Review of Systems


ROS Statement: 


Those systems with pertinent positive or pertinent negative responses have been 

documented in the HPI.





ROS Other: All systems not noted in ROS Statement are negative.


Constitutional: Denies: fever


Eyes: Denies: eye pain


ENT: Denies: ear pain


Respiratory: Denies: dyspnea


Cardiovascular: Denies: chest pain


Endocrine: Reports: fatigue


Gastrointestinal: Reports: as per HPI, diarrhea





Past Medical History


Past Medical History: Cancer, Hypertension, Skin Disorder


Additional Past Medical History / Comment(s): SKIN CANCER, has a cold sore on 

lip. c diff.


History of Any Multi-Drug Resistant Organisms: None Reported


Past Surgical History: Joint Replacement, Orthopedic Surgery, Tubal Ligation


Additional Past Surgical History / Comment(s): SURGERY FOR SKIN CANCER-LEG,  HIP

ORIF, femur fx, bilateral knees replaced


Past Anesthesia/Blood Transfusion Reactions: No Reported Reaction


Past Psychological History: No Psychological Hx Reported


Smoking Status: Never smoker


Past Alcohol Use History: Daily


Past Drug Use History: None Reported





General Exam


Limitations: no limitations


General appearance: alert, in no apparent distress


Head exam: Present: normocephalic


Eye exam: Present: normal appearance


ENT exam: Present: mucous membranes moist


Neck exam: Present: normal inspection


Respiratory exam: Present: normal lung sounds bilaterally


Cardiovascular Exam: Present: regular rate, normal rhythm


GI/Abdominal exam: Present: soft.  Absent: distended, tenderness


Extremities exam: Present: normal inspection


Neurological exam: Present: alert


Psychiatric exam: Present: normal affect, normal mood


Skin exam: Present: normal color





Course


                                   Vital Signs











  07/08/24





  16:42


 


Temperature 97.6 F


 


Pulse Rate 84


 


Respiratory 20





Rate 


 


Blood Pressure 117/75


 


O2 Sat by Pulse 99





Oximetry 














EKG Findings





- EKG Results:


EKG: interpreted by ERMD (Atrial rhythm with a rate of 86.  Left axis.), normal 

QRS, normal ST/T





Medical Decision Making





- Medical Decision Making





Was pt. sent in by a medical professional or institution (, PA, NP, urgent 

care, hospital, or nursing home...) When possible be specific


@  -Patient was sent in by Dr. Dagher's office


Did you speak to anyone other than the patient for history (EMS, parent, family,

police, friend...)? What history was obtained from this source 


@  -I did speak with Dr. Hernandez who provided history of previous admission


Did you review nursing and triage notes (agree or disagree)?  Why? 


@  -I reviewed and agree with nursing and triage notes


Were old charts reviewed (outside hosp., previous admission, EMS record, old 

EKG, old radiological studies, urgent care reports/EKG's, nursing home records)?

Report findings 


@  -No old charts were reviewed


Differential Diagnosis (chest pain, altered mental status, abdominal pain women,

abdominal pain men, vaginal bleeding, weakness, fever, dyspnea, syncope, 

headache, dizziness, GI bleed, back pain, seizure, CVA, palpatations, mental 

health, musculoskeletal)? 


@  -Differential Abdominal Pain Women:


Appendicitis, Cholecystitis, diverticulosis, ischemic bowel, pancreatitis, 

hepatitis, UTI, gastroenteritis, AAA, incarcerated hernia, bowel obstruction, 

constipation, inflammatory bowel, hepatitis, peptic ulcer disease, splenic infa

rction, perforated viscus, vulvitis, ovarian torsion, PID, kidney stone, 

placenta abruption, this is not meant to be an all-inclusive list





EKG interpreted by me (3pts min.).


@  -As above


X-rays interpreted by me (1pt min.).


@  -None done


CT interpreted by me (1pt min.).


@  -None done


U/S interpreted by me (1pt. min.).


@  -None done


What testing was considered but not performed or refused? (CT, X-rays, U/S, 

labs)? Why?


@  -None


What meds were considered but not given or refused? Why?


@  -None


Did you discuss the management of the patient with other professionals 

(professionals i.e. MOISES Anne, NP, lab, RT, psych nurse, , , 

teacher, , )? Give summary


@  -Dr. Hernandez who would like his patient admitted


Was smoking cessation discussed for >3mins.?


@  -No


Was critical care preformed (if so, how long)?


@  -No


Were there social determinants of health that impacted care today? How? 

(Homelessness, low income, unemployed, alcoholism, drug addiction, trans

portation, low edu. Level, literacy, decrease access to med. care, assisted, rehab)?


@  -No


Was there de-escalation of care discussed even if they declined (Discuss DNR or 

withdrawal of care, Hospice)? DNR status


@  -No


What co-morbidities impacted this encounter? (DM, HTN, Smoking, COPD, CAD, 

Cancer, CVA, ARF, Chemo, Hep., AIDS, mental health diagnosis, sleep apnea, 

morbid obesity)?


@  -Recent C. difficile


Was patient admitted / discharged? Hospital course, mention meds given and 

route, prescriptions, significant lab abnormalities, going to OR and other 

pertinent info.


@  -Patient updated on plan.  Patient will be admitted.  Admission orders 

written.


Undiagnosed new problem with uncertain prognosis?


@  -No


Drug Therapy requiring intensive monitoring for toxicity (Heparin, Nitro, 

Insulin, Cardizem)?


@  -No


Were any procedures done?


@  -No


Diagnosis/symptom?


@  -C. difficile, diarrhea


Acute, or Chronic, or Acute on Chronic?


@  -Acute on chronic, acute on chronic


Uncomplicated (without systemic symptoms) or Complicated (systemic symptoms)?


@  -Complicated with hyponatremia


Side effects of treatment?


@  -No


Exacerbation, Progression, or Severe Exacerbation?


@  -No


Poses a threat to life or bodily function? How? (Chest pain, USA, MI, pneumonia,

PE, COPD, DKA, ARF, appy, cholecystitis, CVA, Diverticulitis, Homicidal, 

Suicidal, threat to staff... and all critical care pts)


@  -No








- Lab Data


Result diagrams: 


                                 07/08/24 16:57





                                 07/08/24 16:57


                                   Lab Results











  07/08/24 07/08/24 07/08/24 Range/Units





  16:57 16:57 16:57 


 


WBC  7.2    (3.8-10.6)  k/uL


 


RBC  2.56 L    (3.80-5.40)  m/uL


 


Hgb  9.9 L    (11.4-16.0)  gm/dL


 


Hct  26.6 L    (34.0-46.0)  %


 


MCV  103.7 H    (80.0-100.0)  fL


 


MCH  38.7 H    (25.0-35.0)  pg


 


MCHC  37.4 H    (31.0-37.0)  g/dL


 


RDW  12.3    (11.5-15.5)  %


 


Plt Count  240    (150-450)  k/uL


 


MPV  8.8    


 


Neutrophils %  48    %


 


Lymphocytes %  34    %


 


Monocytes %  12    %


 


Eosinophils %  2    %


 


Basophils %  0    %


 


Neutrophils #  3.5    (1.3-7.7)  k/uL


 


Lymphocytes #  2.5    (1.0-4.8)  k/uL


 


Monocytes #  0.9    (0-1.0)  k/uL


 


Eosinophils #  0.1    (0-0.7)  k/uL


 


Basophils #  0.0    (0-0.2)  k/uL


 


Macrocytosis  Slight    


 


PT   11.4   (10.0-12.5)  sec


 


INR   1.1   (<1.2)  


 


APTT   24.6   (22.0-30.0)  sec


 


Sodium    127 L  (137-145)  mmol/L


 


Potassium    4.5  (3.5-5.1)  mmol/L


 


Chloride    96 L  ()  mmol/L


 


Carbon Dioxide    22  (22-30)  mmol/L


 


Anion Gap    9  mmol/L


 


BUN    8  (7-17)  mg/dL


 


Creatinine    0.68  (0.52-1.04)  mg/dL


 


Est GFR (CKD-EPI)AfAm    >90  (>60 ml/min/1.73 sqM)  


 


Est GFR (CKD-EPI)NonAf    82  (>60 ml/min/1.73 sqM)  


 


Glucose    107 H  (74-99)  mg/dL


 


Plasma Lactic Acid John     (0.7-2.0)  mmol/L


 


Calcium    9.7  (8.4-10.2)  mg/dL


 


Phosphorus    3.9  (2.5-4.5)  mg/dL


 


Magnesium    1.2 L  (1.6-2.3)  mg/dL


 


Total Bilirubin    0.7  (0.2-1.3)  mg/dL


 


AST    20  (14-36)  U/L


 


ALT    14  (4-34)  U/L


 


Alkaline Phosphatase    75  ()  U/L


 


Troponin I     (0.000-0.034)  ng/mL


 


Total Protein    6.2 L  (6.3-8.2)  g/dL


 


Albumin    3.7  (3.5-5.0)  g/dL














  07/08/24 07/08/24 Range/Units





  16:57 16:57 


 


WBC    (3.8-10.6)  k/uL


 


RBC    (3.80-5.40)  m/uL


 


Hgb    (11.4-16.0)  gm/dL


 


Hct    (34.0-46.0)  %


 


MCV    (80.0-100.0)  fL


 


MCH    (25.0-35.0)  pg


 


MCHC    (31.0-37.0)  g/dL


 


RDW    (11.5-15.5)  %


 


Plt Count    (150-450)  k/uL


 


MPV    


 


Neutrophils %    %


 


Lymphocytes %    %


 


Monocytes %    %


 


Eosinophils %    %


 


Basophils %    %


 


Neutrophils #    (1.3-7.7)  k/uL


 


Lymphocytes #    (1.0-4.8)  k/uL


 


Monocytes #    (0-1.0)  k/uL


 


Eosinophils #    (0-0.7)  k/uL


 


Basophils #    (0-0.2)  k/uL


 


Macrocytosis    


 


PT    (10.0-12.5)  sec


 


INR    (<1.2)  


 


APTT    (22.0-30.0)  sec


 


Sodium    (137-145)  mmol/L


 


Potassium    (3.5-5.1)  mmol/L


 


Chloride    ()  mmol/L


 


Carbon Dioxide    (22-30)  mmol/L


 


Anion Gap    mmol/L


 


BUN    (7-17)  mg/dL


 


Creatinine    (0.52-1.04)  mg/dL


 


Est GFR (CKD-EPI)AfAm    (>60 ml/min/1.73 sqM)  


 


Est GFR (CKD-EPI)NonAf    (>60 ml/min/1.73 sqM)  


 


Glucose    (74-99)  mg/dL


 


Plasma Lactic Acid John  1.4   (0.7-2.0)  mmol/L


 


Calcium    (8.4-10.2)  mg/dL


 


Phosphorus    (2.5-4.5)  mg/dL


 


Magnesium    (1.6-2.3)  mg/dL


 


Total Bilirubin    (0.2-1.3)  mg/dL


 


AST    (14-36)  U/L


 


ALT    (4-34)  U/L


 


Alkaline Phosphatase    ()  U/L


 


Troponin I   <0.012  (0.000-0.034)  ng/mL


 


Total Protein    (6.3-8.2)  g/dL


 


Albumin    (3.5-5.0)  g/dL














Disposition


Clinical Impression: 


 C. difficile diarrhea





Disposition: ADMITTED AS IP TO THIS HOSP


Condition: Serious


Is patient prescribed a controlled substance at d/c from ED?: No


Time of Disposition: 18:04

## 2024-07-09 LAB
ALBUMIN SERPL-MCNC: 3.5 G/DL (ref 3.8–4.9)
ALBUMIN/GLOB SERPL: 1.75 RATIO (ref 1.6–3.17)
ALP SERPL-CCNC: 70 U/L (ref 41–126)
ALT SERPL-CCNC: 13 U/L (ref 8–44)
ANION GAP SERPL CALC-SCNC: 12.1 MMOL/L (ref 4–12)
AST SERPL-CCNC: 14 U/L (ref 13–35)
BASOPHILS # BLD AUTO: 0.06 X 10*3/UL (ref 0–0.1)
BASOPHILS NFR BLD AUTO: 0.6 %
BUN SERPL-SCNC: 8.1 MG/DL (ref 9–27)
BUN/CREAT SERPL: 10.12 RATIO (ref 12–20)
CALCIUM SPEC-MCNC: 8.8 MG/DL (ref 8.7–10.3)
CHLORIDE SERPL-SCNC: 97 MMOL/L (ref 96–109)
CO2 SERPL-SCNC: 20.9 MMOL/L (ref 21.6–31.8)
EOSINOPHIL # BLD AUTO: 0.11 X 10*3/UL (ref 0.04–0.35)
EOSINOPHIL NFR BLD AUTO: 1.1 %
ERYTHROCYTE [DISTWIDTH] IN BLOOD BY AUTOMATED COUNT: 3.1 X 10*6/UL (ref 4.1–5.2)
ERYTHROCYTE [DISTWIDTH] IN BLOOD: 13.2 % (ref 11.5–14.5)
GLOBULIN SER CALC-MCNC: 2 G/DL (ref 1.6–3.3)
GLUCOSE SERPL-MCNC: 89 MG/DL (ref 70–110)
HCT VFR BLD AUTO: 31.3 % (ref 37.2–46.3)
HGB BLD-MCNC: 10.4 G/DL (ref 12–15)
IMM GRANULOCYTES BLD QL AUTO: 0.4 %
KETONES UR QL STRIP.AUTO: (no result)
LYMPHOCYTES # SPEC AUTO: 2.35 X 10*3/UL (ref 0.9–5)
LYMPHOCYTES NFR SPEC AUTO: 22.7 %
MCH RBC QN AUTO: 33.5 PG (ref 27–32)
MCHC RBC AUTO-ENTMCNC: 33.2 G/DL (ref 32–37)
MCV RBC AUTO: 101 FL (ref 80–97)
MONOCYTES # BLD AUTO: 1.99 X 10*3/UL (ref 0.2–1)
MONOCYTES NFR BLD AUTO: 19.3 %
NEUTROPHILS # BLD AUTO: 5.78 X 10*3/UL (ref 1.8–7.7)
NEUTROPHILS NFR BLD AUTO: 55.9 %
NRBC BLD AUTO-RTO: 0 X 10*3/UL (ref 0–0.01)
PH UR: 5.5 [PH] (ref 5–8)
PLATELET # BLD AUTO: 266 X 10*3/UL (ref 140–440)
POTASSIUM SERPL-SCNC: 4 MMOL/L (ref 3.5–5.5)
PROT SERPL-MCNC: 5.5 G/DL (ref 6.2–8.2)
RBC UR QL: 1 /HPF (ref 0–5)
SODIUM SERPL-SCNC: 130 MMOL/L (ref 135–145)
SP GR UR: 1 (ref 1–1.03)
SQUAMOUS UR QL AUTO: 1 /HPF (ref 0–4)
UROBILINOGEN UR QL STRIP: <2 MG/DL (ref ?–2)
WBC # BLD AUTO: 10.33 X 10*3/UL (ref 4.5–10)
WBC #/AREA URNS HPF: 50 /HPF (ref 0–5)

## 2024-07-09 RX ADMIN — THERA TABS SCH EACH: TAB at 09:09

## 2024-07-09 RX ADMIN — ATORVASTATIN CALCIUM SCH MG: 20 TABLET, FILM COATED ORAL at 09:09

## 2024-07-09 RX ADMIN — CHOLECALCIFEROL TAB 125 MCG (5000 UNIT) SCH MCG: 125 TAB at 09:09

## 2024-07-09 RX ADMIN — FIDAXOMICIN SCH MG: 200 TABLET, FILM COATED ORAL at 14:03

## 2024-07-09 RX ADMIN — I-VITE, TAB 1000-60-2MG (60/BT) SCH EACH: TAB at 09:09

## 2024-07-09 RX ADMIN — FOLIC ACID SCH MG: 1 TABLET ORAL at 09:16

## 2024-07-09 RX ADMIN — ASPIRIN 81 MG CHEWABLE TABLET SCH MG: 81 TABLET CHEWABLE at 09:09

## 2024-07-09 RX ADMIN — ENOXAPARIN SODIUM SCH MG: 40 INJECTION SUBCUTANEOUS at 09:09

## 2024-07-09 NOTE — P.HPIM
History of Present Illness


H&P Date: 24


Chief Complaint: Recurrent C. difficile colitis/hyponatremia





HISTORY OF PRESENT ILLNESS:





This is an 79-year-old  female with a previous medical history 

significant for hypertension and hypertensive cardiovascular disease, 

hyperlipidemia, history of osteoarthritis, spondylosis of the lumbar spine 

thoracic spine, history of osteopenia, patient was recently hospitalized at 

[facility] after she was admitted for severe abdominal pain 

associated with severe diarrhea she was diagnosed with C. difficile colitis, she

was treated with vancomycin 125 mg orally 4 times every day for about 4 weeks, 

and she came to the office for evaluation and follow-up few days ago, and she 

was feeling a bit better, however she presented to the yesterday to the office w

ith increased dizziness lightheadedness not able to keep anything down and 

severe diarrhea not able to tolerate anything she has not been eating anything 

at this time, she patient was hypotensive she was referred to the emergency 

department for evaluation had a CT deferred toxins came back positive, she was 

started on IV fluid resuscitation, she was placed on vancomycin to 50 mg orally 

4 times every day, consulted infectious disease to see if the patient is a 

candidate for Dificid 200 mg orally twice every day since she had failed the 

first management, patient also will be started on Questran 4 g orally twice 

every day, she will place on probiotic as well, I will admit the patient to the 

hospital because of significant hypovolemic hyponatremia and significant 

orthostatic changes.








REVIEW OF SYSTEMS:





Constitutional: No documented fever, no chills, no night sweats.  No weight 

change. positive for  weakness, positive for fatigue or lethargy.  No daytime 

sleepiness.


EENT: No headache.  No blurred vision or double vision, no loss of vision.  No 

loss of Hearing, no ringing in the ears, no dizziness.  No nasal drainage or 

congestion.  No epistaxis.  No sore throat.


Lungs: No shortness of breath, no cough, no sputum production.  No wheezing.  

Reports dyspnea with activity.


Cardiovascular: No chest pain, no lower extremity edema.  No palpitations.  No 

paroxysmal nocturnal dyspnea.  No orthopnea.  No lightheadedness or dizziness.  

No syncopal episodes.


Abdominal: Reports abdominal pain.  positive for  nausea, vomiting.  positive 

for  diarrhea.  No constipation.  No bloody or tarry stools reports loss of 

appetite.


Genitourinary: No dysuria, increased frequency, urgency.  No urinary retention.


Musculoskeletal: No myalgias.  positive for  muscle weakness, positive for gait 

dysfunction, no frequent falls.  No back pain.  No neck pain.


Integumentary: right leg with scab   No rash or pruritus.  No unusual bruising. 

No change in hair or nails.


Neurologic: No aphasia. No facial droop. No change in mentation. No head injury.

No headache. No paralysis. No paresthesia.


Psychiatric: No depression.  No anxiety.  No mood swings.


Endocrine: No abnormal blood sugars.  No weight change.  





PAST MEDICAL HISTORY:





Hypertension and hypertensive cardiovascular disease.


Hyperlipidemia.


Osteoarthritis.


Osteoporosis


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


Skin cancer.


Mild Aortic valve stenosis





PAST SURGICAL HISTORY:





Bilateral total knee arthroplasty.


Right hip IM nailing.








SOCIAL HISTORY:





Patient is a lifelong non-smoker, she drinks alcohol socially, she denies any dr

ug use or abuse, she lives with her .





FAMILY HISTORY: 





Patient did not know much about her father, her mother  at age of 90 she had

history of hypertension hyperlipidemia, she  from old age, patient had 5 

brothers one of her brothers  from cardiac arrest had history of atrial fi

brillation hypertension and chronic kidney disease, patient had 1 sister who 

 as well patient has 2 daughters one of them with non-Hodgkin lymphoma and 

and she is recently diagnosed with bladder cancer.





PHYSICAL EXAMINATION:





General: This is a an 79-year-old  female who is laying down in bed in 

no apparent distress.


HEENT: Head is atraumatic, normocephalic, pupils were equal round reactive to 

light and recommendation, extraocular muscle movement were intact, sclera 

nonicteric, conjunctivae were pale, mucous membranes of the mouth are somewhat 

dry.


Neck: Supple, no JVP, normal carotid upstroke bilaterally, no lymphadenopathy.


Chest: Decreased breath sounds at the bases, few rhonchi, no expiratory wheezes,

no chest wall tenderness, no intercostal retractions.


Heart: First heart sound is normal, second heart sound is normal there is 

systolic ejection murmur 2/6 located in the left sternal border.


Abdomen: Soft, mild tenderness to the left lower quadrant , nondistended, posi

tive bowel sounds.


Extremities: There is no edema no calf tenderness DP +2 bilaterally, there is an

denuded hemorrhagic blister from the lower aspect of the right leg with an 

eschar on top of it.


Neurologic examination: Patient is awake alert and oriented x3, cranial nerves 

II-12 appear grossly intact, muscle power were 5 out of 5 in upper extremities 

and 5 out of 5 in bilateral lower extremities, deep tendon reflexes normal 

bilaterally.





ASSESSMENT AND PLAN:





1.  Recurrent C. difficile colitis with SIRS.  Start the patient on vancomycin 

to 50 mg orally 4 times every day, discussed with infectious disease the 

possibility of possible Dificid 200 mg orally twice daily for the next 10 days, 

start the patient on Questran 4 g orally twice every day, start probiotic, 

monitor the patient's symptoms very closely, continue IV fluid in the form of 

normal saline at 100 cc an hour, monitor the patient symptoms very closely.





2.  Hypovolemic hyponatremia.  Continue IV fluid resuscitation, monitor the 

patient CMP in the next 24 hours.





3.  Hypertension and hypertensive cardiovascular disease.  Continue patient on 

carvedilol 37.5 mg orally twice every day, continue patient on losartan 100 mg 

orally once every day, discontinue spironolactone.  Monitor the patient blood 

pressure very closely.





4.  Mixed hyperlipidemia.  Continue patient on rosuvastatin 10 mg once every 

day, monitor the patient lipid panel, keep LDL 55-70.





5.  Osteopenia.  Continue patient on alendronate 70 mg once every week, continue

calcium 1200 mg once every day, continue vitamin D3 2000 unit once every day.  

Patient is up-to-date on her DEXA scan.





6.  Vitamin D deficiency.  Continue vitamin D3 2000 units once every day.





7.  Osteoarthritis.  Continue current pain management.





8.  DVT prophylaxis.  Start the patient on Lovenox 40 mg subcutaneous every 24 

hours.  Bilateral knee-high IVY hose.





9.  GI prophylaxis.  Continue patient on famotidine 20 mg at bedtime.





10.  Admit to inpatient.  Estimated length of stay 2 midnights.





11.  Patient is full code.





Past Medical History


Past Medical History: Cancer, Hypertension, Skin Disorder


Additional Past Medical History / Comment(s): SKIN CANCER, has a cold sore on 

lip. c diff.


History of Any Multi-Drug Resistant Organisms: None Reported


Past Surgical History: Joint Replacement, Orthopedic Surgery, Tubal Ligation


Additional Past Surgical History / Comment(s): SURGERY FOR SKIN CANCER-LEG,  HIP

ORIF, femur fx, bilateral knees replaced, RLE hematoma I and D


Past Anesthesia/Blood Transfusion Reactions: No Reported Reaction


Past Psychological History: No Psychological Hx Reported


Smoking Status: Never smoker


Past Alcohol Use History: Daily


Past Drug Use History: None Reported





Medications and Allergies


                                Home Medications











 Medication  Instructions  Recorded  Confirmed  Type


 


Aspirin 81 mg PO DAILY 14 History


 


Multivitamin/Iron/Folic Acid 1 tab PO DAILY 14 History





[Centrum Complete Multivit Tab]    


 


Calcium Carbonate/Vitamin D3 1 tab PO BID 14 History





[Caltrate 600 Plus D3 Tablet]    


 


Folic Acid 800 mg PO DAILY 14 History


 


Losartan Potassium 100 mg PO DAILY 14 History


 


Alendronate Sodium [Fosamax] 70 mg PO JOHNSON 24 History


 


Cholecalciferol [Vitamin D3 (125 125 mcg PO DAILY 24 History





Mcg = 5000 Iu)]    


 


Rosuvastatin [Crestor] 10 mg PO DAILY 24 History


 


Spironolactone 12.5 mg PO DAILY 24 History


 


Vit C/E/Zn/Coppr/Lutein/Zeaxan 1 cap PO DAILY 24 History





[Preservision Areds 2 Softgel]    


 


carvediloL [Coreg] 37.5 mg PO BID 24 History


 


Cholestyramine/Aspartame 4 gm PO DAILY 24 History





[Cholestyramine Light Packet]    


 


Fidaxomicin [Dificid] 200 mg PO BID #20 tablet 24  Rx








                                    Allergies











Allergy/AdvReac Type Severity Reaction Status Date / Time


 


codeine Allergy  Rash/Hives Verified 24 20:30


 


hydrocodone bitartrate Allergy  Itching Verified 24 20:30





[From [place]]     


 


metoprolol Allergy  Rash/Hives Verified 24 20:30


 


Sulfa (Sulfonamide AdvReac  LIGHT Verified 24 20:30





Antibiotics)   HEADED,  





   VERTIGO  


 


sulfamethoxazole AdvReac  LIGHT Verified 24 20:30





[From Bactrim]   HEADED,  





   VERTIGO  


 


tramadol AdvReac  VERTIGO Verified 24 20:30


 


trimethoprim [From Bactrim] AdvReac  LIGHT Verified 24 20:30





   HEADED,  





   VERTIGO  














Physical Exam


Vitals: 


                                   Vital Signs











  Temp Pulse Pulse Resp BP BP Pulse Ox


 


 24 07:17  97.9 F   76  16   153/81  95


 


 24 01:30  97.5 F L   81  19   114/56  95


 


 24 22:14  97.9 F   92  18   158/63  99


 


 24 21:46   94   16  166/74   97


 


 24 16:42  97.6 F  84   20  117/75   99








                                Intake and Output











 24





 22:59 06:59 14:59


 


Other:   


 


  Voiding Method   Toilet


 


  # Voids  1 1


 


  # Bowel Movements  2 


 


  Weight 62.142 kg  














Results


CBC & Chem 7: 


                                 24 05:03





                                 24 05:03


Labs: 


                  Abnormal Lab Results - Last 24 Hours (Table)











  24 Range/Units





  16:57 16:57 05:03 


 


WBC    10.33 H  (4.50-10.00)  X 10*3/uL


 


RBC  2.56 L   3.10 L  (3.80-5.40)  m/uL


 


Hgb  9.9 L   10.4 L  (11.4-16.0)  gm/dL


 


Hct  26.6 L   31.3 L  (34.0-46.0)  %


 


MCV  103.7 H   101.0 H  (80.0-100.0)  fL


 


MCH  38.7 H   33.5 H  (25.0-35.0)  pg


 


MCHC  37.4 H    (31.0-37.0)  g/dL


 


Monocytes #    1.99 H  (0.20-1.00)  X 10*3/uL


 


Sodium   127 L   (137-145)  mmol/L


 


Chloride   96 L   ()  mmol/L


 


Carbon Dioxide     (21.6-31.8)  mmol/L


 


Anion Gap     (4.00-12.00)  mmol/L


 


BUN     (9.0-27.0)  mg/dL


 


BUN/Creatinine Ratio     (12.00-20.00)  Ratio


 


Glucose   107 H   (74-99)  mg/dL


 


Magnesium   1.2 L   (1.6-2.3)  mg/dL


 


Total Protein   6.2 L   (6.3-8.2)  g/dL


 


Albumin     (3.8-4.9)  g/dL


 


Urine Ketones     (Negative)  


 


Ur Leukocyte Esterase     (Negative)  


 


Urine WBC     (0-5)  /hpf


 


Urine Bacteria     (None)  /hpf


 


Urine Mucus     (None)  /hpf














  24 Range/Units





  05:03 07:26 


 


WBC    (4.50-10.00)  X 10*3/uL


 


RBC    (3.80-5.40)  m/uL


 


Hgb    (11.4-16.0)  gm/dL


 


Hct    (34.0-46.0)  %


 


MCV    (80.0-100.0)  fL


 


MCH    (25.0-35.0)  pg


 


MCHC    (31.0-37.0)  g/dL


 


Monocytes #    (0.20-1.00)  X 10*3/uL


 


Sodium  130 L   (137-145)  mmol/L


 


Chloride    ()  mmol/L


 


Carbon Dioxide  20.9 L   (21.6-31.8)  mmol/L


 


Anion Gap  12.10 H   (4.00-12.00)  mmol/L


 


BUN  8.1 L   (9.0-27.0)  mg/dL


 


BUN/Creatinine Ratio  10.12 L   (12.00-20.00)  Ratio


 


Glucose    (74-99)  mg/dL


 


Magnesium    (1.6-2.3)  mg/dL


 


Total Protein  5.5 L   (6.3-8.2)  g/dL


 


Albumin  3.5 L   (3.8-4.9)  g/dL


 


Urine Ketones   1+ H  (Negative)  


 


Ur Leukocyte Esterase   Large H  (Negative)  


 


Urine WBC   50 H  (0-5)  /hpf


 


Urine Bacteria   Few H  (None)  /hpf


 


Urine Mucus   Rare H  (None)  /hpf














Thrombosis Risk Factor Assmnt





- Choose All That Apply


Any of the Below Risk Factors Present?: Yes


Each Factor Represents 1 point: Obesity (BMI >25)


Each Risk Factor Represents 3 Points: Age 75 years or older


Other congenital or acquired thrombophilia - If yes, enter type in comment: No


Thrombosis Risk Factor Assessment Total Risk Factor Score: 4


Thrombosis Risk Factor Assessment Level: Moderate Risk

## 2024-07-10 LAB
ALBUMIN SERPL-MCNC: 3.3 G/DL (ref 3.8–4.9)
ALBUMIN/GLOB SERPL: 1.57 RATIO (ref 1.6–3.17)
ALP SERPL-CCNC: 78 U/L (ref 41–126)
ALT SERPL-CCNC: 12 U/L (ref 8–44)
ANION GAP SERPL CALC-SCNC: 13.4 MMOL/L (ref 4–12)
AST SERPL-CCNC: 14 U/L (ref 13–35)
BASOPHILS # BLD AUTO: 0.04 X 10*3/UL (ref 0–0.1)
BASOPHILS NFR BLD AUTO: 0.3 %
BUN SERPL-SCNC: 5 MG/DL (ref 9–27)
BUN/CREAT SERPL: 8.33 RATIO (ref 12–20)
CALCIUM SPEC-MCNC: 8.5 MG/DL (ref 8.7–10.3)
CHLORIDE SERPL-SCNC: 97 MMOL/L (ref 96–109)
CO2 SERPL-SCNC: 19.6 MMOL/L (ref 21.6–31.8)
EOSINOPHIL # BLD AUTO: 0 X 10*3/UL (ref 0.04–0.35)
EOSINOPHIL NFR BLD AUTO: 0 %
ERYTHROCYTE [DISTWIDTH] IN BLOOD BY AUTOMATED COUNT: 3.04 X 10*6/UL (ref 4.1–5.2)
ERYTHROCYTE [DISTWIDTH] IN BLOOD: 13.3 % (ref 11.5–14.5)
GLOBULIN SER CALC-MCNC: 2.1 G/DL (ref 1.6–3.3)
GLUCOSE SERPL-MCNC: 113 MG/DL (ref 70–110)
HCT VFR BLD AUTO: 30.4 % (ref 37.2–46.3)
HGB BLD-MCNC: 10.3 G/DL (ref 12–15)
IMM GRANULOCYTES BLD QL AUTO: 0.5 %
LYMPHOCYTES # SPEC AUTO: 1.58 X 10*3/UL (ref 0.9–5)
LYMPHOCYTES NFR SPEC AUTO: 11.6 %
MCH RBC QN AUTO: 33.9 PG (ref 27–32)
MCHC RBC AUTO-ENTMCNC: 33.9 G/DL (ref 32–37)
MCV RBC AUTO: 100 FL (ref 80–97)
MONOCYTES # BLD AUTO: 1.65 X 10*3/UL (ref 0.2–1)
MONOCYTES NFR BLD AUTO: 12.1 %
NEUTROPHILS # BLD AUTO: 10.3 X 10*3/UL (ref 1.8–7.7)
NEUTROPHILS NFR BLD AUTO: 75.5 %
NRBC BLD AUTO-RTO: 0 X 10*3/UL (ref 0–0.01)
PLATELET # BLD AUTO: 263 X 10*3/UL (ref 140–440)
POTASSIUM SERPL-SCNC: 3.5 MMOL/L (ref 3.5–5.5)
PROT SERPL-MCNC: 5.4 G/DL (ref 6.2–8.2)
SODIUM SERPL-SCNC: 130 MMOL/L (ref 135–145)
WBC # BLD AUTO: 13.64 X 10*3/UL (ref 4.5–10)

## 2024-07-10 RX ADMIN — FAMOTIDINE SCH MG: 20 TABLET, FILM COATED ORAL at 08:18

## 2024-07-11 LAB
ALBUMIN SERPL-MCNC: 3.5 G/DL (ref 3.8–4.9)
ALBUMIN/GLOB SERPL: 1.59 RATIO (ref 1.6–3.17)
ALP SERPL-CCNC: 92 U/L (ref 41–126)
ALT SERPL-CCNC: 14 U/L (ref 8–44)
ANION GAP SERPL CALC-SCNC: 11.9 MMOL/L (ref 4–12)
AST SERPL-CCNC: 11 U/L (ref 13–35)
BASOPHILS # BLD AUTO: 0.06 X 10*3/UL (ref 0–0.1)
BASOPHILS NFR BLD AUTO: 0.4 %
BUN SERPL-SCNC: 4.8 MG/DL (ref 9–27)
BUN/CREAT SERPL: 8 RATIO (ref 12–20)
CALCIUM SPEC-MCNC: 8.5 MG/DL (ref 8.7–10.3)
CHLORIDE SERPL-SCNC: 97 MMOL/L (ref 96–109)
CO2 SERPL-SCNC: 19.1 MMOL/L (ref 21.6–31.8)
EOSINOPHIL # BLD AUTO: 0 X 10*3/UL (ref 0.04–0.35)
EOSINOPHIL NFR BLD AUTO: 0 %
ERYTHROCYTE [DISTWIDTH] IN BLOOD BY AUTOMATED COUNT: 3.12 X 10*6/UL (ref 4.1–5.2)
ERYTHROCYTE [DISTWIDTH] IN BLOOD: 13.5 % (ref 11.5–14.5)
GLOBULIN SER CALC-MCNC: 2.2 G/DL (ref 1.6–3.3)
GLUCOSE SERPL-MCNC: 117 MG/DL (ref 70–110)
HCT VFR BLD AUTO: 31.7 % (ref 37.2–46.3)
HGB BLD-MCNC: 10.6 G/DL (ref 12–15)
IMM GRANULOCYTES BLD QL AUTO: 0.6 %
LYMPHOCYTES # SPEC AUTO: 2.23 X 10*3/UL (ref 0.9–5)
LYMPHOCYTES NFR SPEC AUTO: 13 %
MCH RBC QN AUTO: 34 PG (ref 27–32)
MCHC RBC AUTO-ENTMCNC: 33.4 G/DL (ref 32–37)
MCV RBC AUTO: 101.6 FL (ref 80–97)
MONOCYTES # BLD AUTO: 1.5 X 10*3/UL (ref 0.2–1)
MONOCYTES NFR BLD AUTO: 8.8 %
NEUTROPHILS # BLD AUTO: 13.23 X 10*3/UL (ref 1.8–7.7)
NEUTROPHILS NFR BLD AUTO: 77.2 %
NRBC BLD AUTO-RTO: 0 X 10*3/UL (ref 0–0.01)
PLATELET # BLD AUTO: 276 X 10*3/UL (ref 140–440)
POTASSIUM SERPL-SCNC: 4.1 MMOL/L (ref 3.5–5.5)
PROT SERPL-MCNC: 5.7 G/DL (ref 6.2–8.2)
SODIUM SERPL-SCNC: 128 MMOL/L (ref 135–145)
WBC # BLD AUTO: 17.12 X 10*3/UL (ref 4.5–10)

## 2024-07-11 RX ADMIN — IOPAMIDOL PRN ML: 612 INJECTION, SOLUTION INTRAVENOUS at 14:33

## 2024-07-11 RX ADMIN — IBUPROFEN PRN MG: 600 TABLET ORAL at 20:20

## 2024-07-11 RX ADMIN — FUROSEMIDE ONE MG: 10 INJECTION, SOLUTION INTRAMUSCULAR; INTRAVENOUS at 20:19

## 2024-07-11 NOTE — P.PN
Subjective


Progress Note Date: 07/11/24


Principal diagnosis: 





Reason for follow-up is C. difficile colitis





Patient is a 79-year-old  female with a past medical history 

significant for hypertension recently exposed antibiotic for the right lower 

extremity laceration subsequent developing C. difficile colitis that has been 

treated with oral vancomycin with persistent symptoms the patient has been 

admitted to the hospital.


On today's evaluation that is 07/11/2024, the patient continues to be afebrile, 

the patient is on room air and breathing comfortably, the Pt denies having any 

chest pain or cough, the patient did mention she did have semiformed bowel 

movement this morning afterwards she did have 2 bowel movements which is 

baseline loose denies abdominal pain no nausea no vomiting.


Patient white count is up to 17.12, creatinine 0.6, blood cultures are pending





Objective





- Vital Signs


Vital signs: 


                                   Vital Signs











Temp  97.3 F L  07/11/24 07:06


 


Pulse  77   07/11/24 07:06


 


Resp  18   07/11/24 07:06


 


BP  106/69   07/11/24 07:06


 


Pulse Ox  93 L  07/11/24 07:06


 


FiO2      








                                 Intake & Output











 07/10/24 07/11/24 07/11/24





 18:59 06:59 18:59


 


Other:   


 


  Voiding Method Toilet Toilet 


 


  # Voids 1 1 


 


  # Bowel Movements 1 2 














- Exam





GENERAL DESCRIPTION: An elderly female lying in bed in no distress





RESPIRATORY SYSTEM: Unlabored breathing , decreased breath sounds at bases





HEART: S1 S2 regular rate and rhythm ,





ABDOMEN: Soft , no tenderness





EXTREMITIES: No edema feet





- Labs


CBC & Chem 7: 


                                 07/11/24 06:33





                                 07/11/24 06:33


Labs: 


                  Abnormal Lab Results - Last 24 Hours (Table)











  07/10/24 07/10/24 07/11/24 Range/Units





  06:38 06:38 06:33 


 


WBC  13.64 H   17.12 H  (4.50-10.00)  X 10*3/uL


 


RBC  3.04 L   3.12 L  (4.10-5.20)  X 10*6/uL


 


Hgb  10.3 L   10.6 L  (12.0-15.0)  g/dL


 


Hct  30.4 L   31.7 L  (37.2-46.3)  %


 


MCV  100.0 H   101.6 H  (80.0-97.0)  FL


 


MCH  33.9 H   34.0 H  (27.0-32.0)  pg


 


Immature Gran #  0.07 H   0.10 H  (0.00-0.04)  X 10*3/uL


 


Neutrophils #  10.30 H   13.23 H  (1.80-7.70)  X 10*3/uL


 


Monocytes #  1.65 H   1.50 H  (0.20-1.00)  X 10*3/uL


 


Eosinophils #  0 L   0 L  (0.04-0.35)  X 10*3/uL


 


Sodium   130 L   (135-145)  mmol/L


 


Carbon Dioxide   19.6 L   (21.6-31.8)  mmol/L


 


Anion Gap   13.40 H   (4.00-12.00)  mmol/L


 


BUN   5.0 L   (9.0-27.0)  mg/dL


 


BUN/Creatinine Ratio   8.33 L   (12.00-20.00)  Ratio


 


Glucose   113 H   ()  mg/dL


 


Calcium   8.5 L   (8.7-10.3)  mg/dL


 


AST     (13-35)  U/L


 


Total Protein   5.4 L   (6.2-8.2)  g/dL


 


Albumin   3.3 L   (3.8-4.9)  g/dL


 


Albumin/Globulin Ratio   1.57 L   (1.60-3.17)  Ratio














  07/11/24 Range/Units





  06:33 


 


WBC   (4.50-10.00)  X 10*3/uL


 


RBC   (4.10-5.20)  X 10*6/uL


 


Hgb   (12.0-15.0)  g/dL


 


Hct   (37.2-46.3)  %


 


MCV   (80.0-97.0)  FL


 


MCH   (27.0-32.0)  pg


 


Immature Gran #   (0.00-0.04)  X 10*3/uL


 


Neutrophils #   (1.80-7.70)  X 10*3/uL


 


Monocytes #   (0.20-1.00)  X 10*3/uL


 


Eosinophils #   (0.04-0.35)  X 10*3/uL


 


Sodium  128 L  (135-145)  mmol/L


 


Carbon Dioxide  19.1 L  (21.6-31.8)  mmol/L


 


Anion Gap   (4.00-12.00)  mmol/L


 


BUN  4.8 L  (9.0-27.0)  mg/dL


 


BUN/Creatinine Ratio  8.00 L  (12.00-20.00)  Ratio


 


Glucose  117 H  ()  mg/dL


 


Calcium  8.5 L  (8.7-10.3)  mg/dL


 


AST  11 L  (13-35)  U/L


 


Total Protein  5.7 L  (6.2-8.2)  g/dL


 


Albumin  3.5 L  (3.8-4.9)  g/dL


 


Albumin/Globulin Ratio  1.59 L  (1.60-3.17)  Ratio








                      Microbiology - Last 24 Hours (Table)











 07/08/24 18:11 Blood Culture - Preliminary





 Blood 














Assessment and Plan


(1) C. difficile colitis


Current Visit: Yes   Status: Acute   Code(s): A04.72 - ENTEROCOLITIS D/T 

CLOSTRIDIUM DIFFICILE, NOT SPCF AS RECUR   SNOMED Code(s): 468343422


   





(2) Leukocytosis


Current Visit: Yes   Status: Acute   Code(s): D72.829 - ELEVATED WHITE BLOOD 

CELL COUNT, UNSPECIFIED   SNOMED Code(s): 850321309


   


Plan: 





1patient presented hospital with severe diarrhea that has been going on for the

last few weeks and apparently the patient has failed outpatient oral vancomycin 

therapy


2-patient seem to have shown clinical improvement and his diarrhea has slowed on

however the patient noticed to have worsening of the white count was up to 

17,000 we will repeat a CBC with a.m. lab if any further worsening of the white 

count will check a CT abdominal pelvis for now continue with the Dificid and 

Questran


Dictation was produced using dragon dictation software. please excuse any 

grammatical, word or spelling errors. 








Time with Patient: Less than 30

## 2024-07-11 NOTE — P.PN
Subjective


Progress Note Date: 07/10/24





HISTORY OF PRESENT ILLNESS:





This is an 79-year-old  female with a previous medical history signi

ficant for hypertension and hypertensive cardiovascular disease, hyperlipidemia,

history of osteoarthritis, spondylosis of the lumbar spine thoracic spine, 

history of osteopenia, patient [place] [facility] she was admitted for severe abdominal pain associated with severe 

diarrhea she was diagnosed with C. difficile colitis, she was treated with 

vancomycin 125 mg orally 4 times every day for about 4 weeks, and she came to 

the office for evaluation and follow-up few days ago, and she was feeling a bit 

better, however she presented to the yesterday to the office with increased 

dizziness lightheadedness not able to keep anything down and severe diarrhea not

able to tolerate anything she has not been eating anything at this time, she 

patient was hypotensive she was referred to the emergency department for 

evaluation had a CT deferred toxins came back positive, she was started on IV 

fluid resuscitation, she was placed on vancomycin to 50 mg orally 4 times every 

day, consulted infectious disease to see if the patient is a candidate for 

Dificid 200 mg orally twice every day since she had failed the first management,

patient also will be started on Questran 4 g orally twice every day, she will 

place on probiotic as well, I will admit the patient to the hospital because of 

significant hypovolemic hyponatremia and significant orthostatic changes.





7/10: Patient is laying down in bed continues to have loose stool, better than 

he was yesterday, continues to have abdominal cramps associate with nausea but 

no vomiting, she continues to have a loose bowel movement every time she has 

anything to eat, she has no fever or chills at this time, she seems to be 

tolerating the new medication Dr. Masterson has switched her to Dificid 200 mg 

orally twice every day vancomycin was stopped, we will continue to follow-up the

patient very closely, I will discontinue IV fluid at this time, if her symptoms 

are not better we will obtain CT scan of the abdomen pelvis tomorrow morning if 

she is not getting better.








REVIEW OF SYSTEMS:





Constitutional: No documented fever, no chills, no night sweats.  No weight 

change. positive for  weakness, positive for fatigue or lethargy.  No daytime 

sleepiness.


EENT: No headache.  No blurred vision or double vision, no loss of vision.  No 

loss of Hearing, no ringing in the ears, no dizziness.  No nasal drainage or 

congestion.  No epistaxis.  No sore throat.


Lungs: No shortness of breath, no cough, no sputum production.  No wheezing.  

Reports dyspnea with activity.


Cardiovascular: No chest pain, no lower extremity edema.  No palpitations.  No 

paroxysmal nocturnal dyspnea.  No orthopnea.  No lightheadedness or dizziness.  

No syncopal episodes.


Abdominal: Reports abdominal pain.  positive for  nausea, vomiting.  positive 

for  diarrhea.  No constipation.  No bloody or tarry stools reports loss of 

appetite.


Genitourinary: No dysuria, increased frequency, urgency.  No urinary retention.


Musculoskeletal: No myalgias.  positive for  muscle weakness, positive for gait 

dysfunction, no frequent falls.  No back pain.  No neck pain.


Integumentary: right leg with scab   No rash or pruritus.  No unusual bruising. 

No change in hair or nails.


Neurologic: No aphasia. No facial droop. No change in mentation. No head injury.

No headache. No paralysis. No paresthesia.


Psychiatric: No depression.  No anxiety.  No mood swings.


Endocrine: No abnormal blood sugars.  No weight change.  








PHYSICAL EXAMINATION:





General: This is a an 79-year-old  female who is laying down in bed in 

no apparent distress.


HEENT: Head is atraumatic, normocephalic, pupils were equal round reactive to 

light and recommendation, extraocular muscle movement were intact, sclera 

nonicteric, conjunctivae were pale, mucous membranes of the mouth are somewhat 

dry.


Neck: Supple, no JVP, normal carotid upstroke bilaterally, no lymphadenopathy.


Chest: Decreased breath sounds at the bases, few rhonchi, no expiratory wheezes,

no chest wall tenderness, no intercostal retractions.


Heart: First heart sound is normal, second heart sound is normal there is 

systolic ejection murmur 2/6 located in the left sternal border.


Abdomen: Soft, mild tenderness to the left lower quadrant , nondistended, 

positive bowel sounds.


Extremities: There is no edema no calf tenderness DP +2 bilaterally, there is an

denuded hemorrhagic blister from the lower aspect of the right leg with an 

eschar on top of it.


Neurologic examination: Patient is awake alert and oriented x3, cranial nerves 

II-12 appear grossly intact, muscle power were 5 out of 5 in upper extremities 

and 5 out of 5 in bilateral lower extremities, deep tendon reflexes normal 

bilaterally.





ASSESSMENT AND PLAN:





1.  Recurrent C. difficile colitis with SIRS.  Continue patient on Dificid 200 

mg orally twice every day for the next 10 days, continue with Questran 4 g 

orally twice every day, monitor the patient symptoms very closely, continue 

current pain management with Tylenol and Motrin, if not better will check CT 

scan of the abdomen and pelvis in the next 24 hours.





2.  Hypovolemic hyponatremia.  Sodium is stable at 130, discontinue IV fluid, 

monitor the patient CMP over the next 24 hours.





3.  Hypertension and hypertensive cardiovascular disease.  Continue patient on 

carvedilol 37.5 mg orally twice every day, continue patient on losartan 100 mg 

orally once every day, discontinue spironolactone.  Monitor the patient blood 

pressure very closely.





4.  Mixed hyperlipidemia.  Continue patient on rosuvastatin 10 mg once every 

day, monitor the patient lipid panel, keep LDL 55-70.





5.  Osteopenia.  Continue patient on alendronate 70 mg once every week, continue

calcium 1200 mg once every day, continue vitamin D3 2000 unit once every day.  

Patient is up-to-date on her DEXA scan.





6.  Vitamin D deficiency.  Continue vitamin D3 2000 units once every day.





7.  Osteoarthritis.  Continue current pain management.





8.  DVT prophylaxis.  Start the patient on Lovenox 40 mg subcutaneous every 24 

hours.  Bilateral knee-high IVY hose.





9.  GI prophylaxis.  Continue patient on famotidine 20 mg at bedtime.





10.  Physical therapy evaluation.





11.  Disposition is likely home.








Objective





- Vital Signs


Vital signs: 


                                   Vital Signs











Temp  98.7 F   07/10/24 13:52


 


Pulse  97   07/10/24 13:52


 


Resp  16   07/10/24 13:52


 


BP  155/96   07/10/24 13:52


 


Pulse Ox  96   07/10/24 13:52


 


FiO2      








                                 Intake & Output











 07/09/24 07/10/24 07/10/24





 18:59 06:59 18:59


 


Weight 62.142 kg  


 


Other:   


 


  Voiding Method Toilet Toilet Toilet


 


  # Voids 3 1 1


 


  # Bowel Movements  1 1














- Labs


CBC & Chem 7: 


                                 07/11/24 06:33





                                 07/11/24 06:33


Labs: 


                  Abnormal Lab Results - Last 24 Hours (Table)











  07/10/24 07/10/24 Range/Units





  06:38 06:38 


 


WBC  13.64 H   (4.50-10.00)  X 10*3/uL


 


RBC  3.04 L   (4.10-5.20)  X 10*6/uL


 


Hgb  10.3 L   (12.0-15.0)  g/dL


 


Hct  30.4 L   (37.2-46.3)  %


 


MCV  100.0 H   (80.0-97.0)  FL


 


MCH  33.9 H   (27.0-32.0)  pg


 


Immature Gran #  0.07 H   (0.00-0.04)  X 10*3/uL


 


Neutrophils #  10.30 H   (1.80-7.70)  X 10*3/uL


 


Monocytes #  1.65 H   (0.20-1.00)  X 10*3/uL


 


Eosinophils #  0 L   (0.04-0.35)  X 10*3/uL


 


Sodium   130 L  (135-145)  mmol/L


 


Carbon Dioxide   19.6 L  (21.6-31.8)  mmol/L


 


Anion Gap   13.40 H  (4.00-12.00)  mmol/L


 


BUN   5.0 L  (9.0-27.0)  mg/dL


 


BUN/Creatinine Ratio   8.33 L  (12.00-20.00)  Ratio


 


Glucose   113 H  ()  mg/dL


 


Calcium   8.5 L  (8.7-10.3)  mg/dL


 


Total Protein   5.4 L  (6.2-8.2)  g/dL


 


Albumin   3.3 L  (3.8-4.9)  g/dL


 


Albumin/Globulin Ratio   1.57 L  (1.60-3.17)  Ratio








                      Microbiology - Last 24 Hours (Table)











 07/08/24 18:11 Blood Culture - Preliminary





 Blood

## 2024-07-11 NOTE — P.PN
Subjective


Progress Note Date: 07/10/24


Principal diagnosis: 





Reason for follow-up is C. difficile colitis





Patient is a 79-year-old  female with a past medical history 

significant for hypertension recently exposed antibiotic for the right lower 

extremity laceration subsequent developing C. difficile colitis that has been 

treated with oral vancomycin with persistent symptoms the patient has been 

admitted to the hospital.


On today's evaluation that is 07/10/2024,the patient remains to be afebrile, 

patient is on room air not requiring supplemental oxygen and denies any 

shortness of breath no chest pain or cough.Patient did have some nausea and 

crampy abdominal pain diarrhea has slowed down still loose no blood mucus in the

stool.


Patient white count is slightly up to 13.6 creatinine 0.6








Objective





- Vital Signs


Vital signs: 


                                   Vital Signs











Temp  98.7 F   07/10/24 13:52


 


Pulse  97   07/10/24 13:52


 


Resp  16   07/10/24 13:52


 


BP  155/96   07/10/24 13:52


 


Pulse Ox  96   07/10/24 13:52


 


FiO2      








                                 Intake & Output











 07/09/24 07/10/24 07/10/24





 18:59 06:59 18:59


 


Weight 62.142 kg  


 


Other:   


 


  Voiding Method Toilet Toilet Toilet


 


  # Voids 3 1 1


 


  # Bowel Movements  1 1














- Exam





GENERAL DESCRIPTION: An elderly female lying in bed in no distress





RESPIRATORY SYSTEM: Unlabored breathing , decreased breath sounds at bases





HEART: S1 S2 regular rate and rhythm ,





ABDOMEN: Soft , no tenderness





EXTREMITIES: No edema feet





- Labs


CBC & Chem 7: 


                                 07/11/24 06:33





                                 07/11/24 06:33


Labs: 


                  Abnormal Lab Results - Last 24 Hours (Table)











  07/10/24 07/10/24 Range/Units





  06:38 06:38 


 


WBC  13.64 H   (4.50-10.00)  X 10*3/uL


 


RBC  3.04 L   (4.10-5.20)  X 10*6/uL


 


Hgb  10.3 L   (12.0-15.0)  g/dL


 


Hct  30.4 L   (37.2-46.3)  %


 


MCV  100.0 H   (80.0-97.0)  FL


 


MCH  33.9 H   (27.0-32.0)  pg


 


Immature Gran #  0.07 H   (0.00-0.04)  X 10*3/uL


 


Neutrophils #  10.30 H   (1.80-7.70)  X 10*3/uL


 


Monocytes #  1.65 H   (0.20-1.00)  X 10*3/uL


 


Eosinophils #  0 L   (0.04-0.35)  X 10*3/uL


 


Sodium   130 L  (135-145)  mmol/L


 


Carbon Dioxide   19.6 L  (21.6-31.8)  mmol/L


 


Anion Gap   13.40 H  (4.00-12.00)  mmol/L


 


BUN   5.0 L  (9.0-27.0)  mg/dL


 


BUN/Creatinine Ratio   8.33 L  (12.00-20.00)  Ratio


 


Glucose   113 H  ()  mg/dL


 


Calcium   8.5 L  (8.7-10.3)  mg/dL


 


Total Protein   5.4 L  (6.2-8.2)  g/dL


 


Albumin   3.3 L  (3.8-4.9)  g/dL


 


Albumin/Globulin Ratio   1.57 L  (1.60-3.17)  Ratio








                      Microbiology - Last 24 Hours (Table)











 07/08/24 18:11 Blood Culture - Preliminary





 Blood 














Assessment and Plan


(1) C. difficile colitis


Current Visit: Yes   Status: Acute   Code(s): A04.72 - ENTEROCOLITIS D/T 

CLOSTRIDIUM DIFFICILE, NOT SPCF AS RECUR   SNOMED Code(s): 753888727


   


Plan: 





1patient presented hospital with severe diarrhea that has been going on for the

last few weeks and apparently the patient has failed outpatient oral vancomycin 

therapy


2-patient to continue with Dificid 200 mg twice a day along with Questran for 

symptomatic relief


Multiple question concern answered


Dictation was produced using dragon dictation software. please excuse any 

grammatical, word or spelling errors. 








Time with Patient: Less than 30

## 2024-07-11 NOTE — CT
EXAMINATION TYPE: CT abdomen pelvis w con

 

DATE OF EXAM: 7/11/2024

 

COMPARISON: NONE

 

HISTORY: 79-year-old female abdominal pain, cdiff dx

 

TECHNIQUE: Contiguous axial scanning of the abdomen and pelvis following administration of 90 ml Isov
ue 300 IV contrast.  Delayed images through the kidneys and coronal/sagittal reconstructions performe
d.

 

CT DLP: 842.3 mGycm

Automated exposure control for dose reduction was used.

 

 

FINDINGS:

Generalized anasarca change. Heart upper limits of normal in size without pericardial effusion. The a
telectasis at the lower lungs.

 

No focal liver lesion or biliary ductal dilatation. Portal venous system is patent.

 

Gallbladder collapsed with a phrygian cap.

 

Adrenal glands, spleen, and pancreas within normal limits.

 

Kidneys show symmetric uptake and excretion of contrast with axial hypodensity and some perinephric e
shanthi.

 

Tiny fatty umbilical hernia. Subcutaneous air anterior lower abdomen in keeping with subcutaneous inj
ections.

 

No dilated small bowel, free fluid, or free air. No mesenteric or retroperitoneal lymphadenopathy is 
seen.

 

Normal appendix.

 

Oral contrast progressed to the hepatic flexure of the colon. There is left-sided colonic diverticulo
sis. Mild segmental colonic wall thickening may be due to nondistention or a mild colitis.

 

Limited assessment of the pelvis due to extensive streak and beam hardening artifact from the patient
's right total hip orthoplasty. Uterus is anteverted. No obvious abnormal fluid collection the pelvis
 or pelvic lymphadenopathy. Pelvic phleboliths.

 

Bones: Right hip total arthroplasty. Mild degenerative change left hip.

 

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

 

 

IMPRESSION: 

 

1. PATCHY HYPOENHANCEMENT OF BOTH KIDNEYS. CORRELATE FOR BILATERAL PYELONEPHRITIS.

2. MILD SEGMENTAL COLONIC WALL THICKENING MAY BE DUE TO NONDISTENTION VERSUS A NONSPECIFIC MILD COLIT
IS.

3. HEART BORDERLINE ENLARGED WITH ANASARCA CHANGES. CORRELATE FOR FLUID OVERLOAD STATE/THIRD SPACING.


4. LEFT-SIDED COLONIC DIVERTICULOSIS WITHOUT ACUTE DIVERTICULITIS.

## 2024-07-11 NOTE — P.PN
Subjective


Progress Note Date: 07/11/24





HISTORY OF PRESENT ILLNESS:





This is an 79-year-old  female with a previous medical history signi

ficant for hypertension and hypertensive cardiovascular disease, hyperlipidemia,

history of osteoarthritis, spondylosis of the lumbar spine thoracic spine, 

history of osteopenia, patient was recently hospitalized at [facility] after she was admitted for severe abdominal pain associated with severe 

diarrhea she was diagnosed with C. difficile colitis, she was treated with 

vancomycin 125 mg orally 4 times every day for about 4 weeks, and she came to 

the office for evaluation and follow-up few days ago, and she was feeling a bit 

better, however she presented to the yesterday to the office with increased 

dizziness lightheadedness not able to keep anything down and severe diarrhea not

able to tolerate anything she has not been eating anything at this time, she 

patient was hypotensive she was referred to the emergency department for 

evaluation had a CT deferred toxins came back positive, she was started on IV 

fluid resuscitation, she was placed on vancomycin to 50 mg orally 4 times every 

day, consulted infectious disease to see if the patient is a candidate for 

Dificid 200 mg orally twice every day since she had failed the first management,

patient also will be started on Questran 4 g orally twice every day, she will 

place on probiotic as well, I will admit the patient to the hospital because of 

significant hypovolemic hyponatremia and significant orthostatic changes.





7/10: Patient is laying down in bed continues to have loose stool, better than 

he was yesterday, continues to have abdominal cramps associate with nausea but 

no vomiting, she continues to have a loose bowel movement every time she has 

anything to eat, she has no fever or chills at this time, she seems to be 

tolerating the new medication Dr. Masterson has switched her to Dificid 200 mg 

orally twice every day vancomycin was stopped, we will continue to follow-up the

patient very closely, I will discontinue IV fluid at this time, if her symptoms 

are not better we will obtain CT scan of the abdomen pelvis tomorrow morning if 

she is not getting better.





7/11: Patient is laying down in bed, she continues to have diarrhea, she 

continues to have increased abdominal pain, she underwent CT scan of the abdomen

pelvis that showed evidence of mild colitis, diverticulosis, possible 

perisplenic edema, minimal anasarca due to fluid overload, discontinue IV fluid,

give the patient Lasix 20 mg IV push x 1, continue Dificid 200 mg orally twice 

every day, continue Questran 4 g orally twice every day, avoid dairy products, 

avoid vegetables and fresh fruits, continue yogurt, monitor the patient very 

closely, discussed with her  and with her at the bedside.








REVIEW OF SYSTEMS:





Constitutional: No documented fever, no chills, no night sweats.  No weight 

change. positive for  weakness, positive for fatigue or lethargy.  No daytime 

sleepiness.


EENT: No headache.  No blurred vision or double vision, no loss of vision.  No 

loss of Hearing, no ringing in the ears, no dizziness.  No nasal drainage or 

congestion.  No epistaxis.  No sore throat.


Lungs: No shortness of breath, no cough, no sputum production.  No wheezing.  

Reports dyspnea with activity.


Cardiovascular: No chest pain, no lower extremity edema.  No palpitations.  No 

paroxysmal nocturnal dyspnea.  No orthopnea.  No lightheadedness or dizziness.  

No syncopal episodes.


Abdominal: Reports abdominal pain.  positive for  nausea, vomiting.  positive 

for  diarrhea.  No constipation.  No bloody or tarry stools reports loss of 

appetite.


Genitourinary: No dysuria, increased frequency, urgency.  No urinary retention.


Musculoskeletal: No myalgias.  positive for  muscle weakness, positive for gait 

dysfunction, no frequent falls.  No back pain.  No neck pain.


Integumentary: right leg with scab   No rash or pruritus.  No unusual bruising. 

No change in hair or nails.


Neurologic: No aphasia. No facial droop. No change in mentation. No head injury.

No headache. No paralysis. No paresthesia.


Psychiatric: No depression.  No anxiety.  No mood swings.


Endocrine: No abnormal blood sugars.  No weight change.  








PHYSICAL EXAMINATION:





General: This is a an 79-year-old  female who is laying down in bed in 

no apparent distress.


HEENT: Head is atraumatic, normocephalic, pupils were equal round reactive to 

light and recommendation, extraocular muscle movement were intact, sclera 

nonicteric, conjunctivae were pale, mucous membranes of the mouth are somewhat 

dry.


Neck: Supple, no JVP, normal carotid upstroke bilaterally, no lymphadenopathy.


Chest: Decreased breath sounds at the bases, few rhonchi, no expiratory wheezes,

no chest wall tenderness, no intercostal retractions.


Heart: First heart sound is normal, second heart sound is normal there is 

systolic ejection murmur 2/6 located in the left sternal border.


Abdomen: Soft, mild tenderness to the left lower quadrant , nondistended, 

positive bowel sounds.


Extremities: There is no edema no calf tenderness DP +2 bilaterally, there is an

denuded hemorrhagic blister from the lower aspect of the right leg with an 

eschar on top of it.


Neurologic examination: Patient is awake alert and oriented x3, cranial nerves 

II-12 appear grossly intact, muscle power were 5 out of 5 in upper extremities 

and 5 out of 5 in bilateral lower extremities, deep tendon reflexes normal 

bilaterally.





ASSESSMENT AND PLAN:





1.  Recurrent C. difficile colitis with SIRS.  Continue patient on Dificid 200 

mg orally twice every day for the next 10 days, continue with Questran 4 g 

orally twice every day, monitor the patient symptoms very closely, continue 

current pain management with Tylenol and Motrin, CT scan of the abdomen and 

pelvis reviewed showed evidence of mild colitis, diverticulosis, anasarca.





2.  Hypovolemic hyponatremia.  Discontinue IV fluid give the patient Lasix 20 mg

IV push x 1.





3.  Hypertension and hypertensive cardiovascular disease.  Continue patient on 

carvedilol 37.5 mg orally twice every day, continue patient on losartan 100 mg 

orally once every day.  Monitor the patient blood pressure very closely.





4.  Mixed hyperlipidemia.  Continue patient on rosuvastatin 10 mg once every 

day, monitor the patient lipid panel, keep LDL 55-70.





5.  Osteopenia.  Continue patient on alendronate 70 mg once every week, continue

calcium 1200 mg once every day, continue vitamin D3 2000 unit once every day.  

Patient is up-to-date on her DEXA scan.





6.  Vitamin D deficiency.  Continue vitamin D3 2000 units once every day.





7.  Osteoarthritis.  Continue current pain management.





8.  DVT prophylaxis.  Start the patient on Lovenox 40 mg subcutaneous every 24 

hours.  Bilateral knee-high IVY hose.





9.  GI prophylaxis.  Continue patient on famotidine 20 mg at bedtime.





10.  Physical therapy evaluation.





11.  Disposition is likely home.








Objective





- Vital Signs


Vital signs: 


                                   Vital Signs











Temp  97.3 F L  07/11/24 07:06


 


Pulse  77   07/11/24 07:06


 


Resp  18   07/11/24 07:06


 


BP  106/69   07/11/24 07:06


 


Pulse Ox  93 L  07/11/24 07:06


 


FiO2      








                                 Intake & Output











 07/10/24 07/11/24 07/11/24





 18:59 06:59 18:59


 


Other:   


 


  Voiding Method Toilet Toilet 


 


  # Voids 1 1 


 


  # Bowel Movements 1 2 














- Labs


CBC & Chem 7: 


                                 07/11/24 06:33





                                 07/11/24 06:33


Labs: 


                  Abnormal Lab Results - Last 24 Hours (Table)











  07/11/24 07/11/24 Range/Units





  06:33 06:33 


 


WBC  17.12 H   (4.50-10.00)  X 10*3/uL


 


RBC  3.12 L   (4.10-5.20)  X 10*6/uL


 


Hgb  10.6 L   (12.0-15.0)  g/dL


 


Hct  31.7 L   (37.2-46.3)  %


 


MCV  101.6 H   (80.0-97.0)  FL


 


MCH  34.0 H   (27.0-32.0)  pg


 


Immature Gran #  0.10 H   (0.00-0.04)  X 10*3/uL


 


Neutrophils #  13.23 H   (1.80-7.70)  X 10*3/uL


 


Monocytes #  1.50 H   (0.20-1.00)  X 10*3/uL


 


Eosinophils #  0 L   (0.04-0.35)  X 10*3/uL


 


Sodium   128 L  (135-145)  mmol/L


 


Carbon Dioxide   19.1 L  (21.6-31.8)  mmol/L


 


BUN   4.8 L  (9.0-27.0)  mg/dL


 


BUN/Creatinine Ratio   8.00 L  (12.00-20.00)  Ratio


 


Glucose   117 H  ()  mg/dL


 


Calcium   8.5 L  (8.7-10.3)  mg/dL


 


AST   11 L  (13-35)  U/L


 


Total Protein   5.7 L  (6.2-8.2)  g/dL


 


Albumin   3.5 L  (3.8-4.9)  g/dL


 


Albumin/Globulin Ratio   1.59 L  (1.60-3.17)  Ratio








                      Microbiology - Last 24 Hours (Table)











 07/08/24 18:11 Blood Culture - Preliminary





 Blood

## 2024-07-12 LAB
ALBUMIN SERPL-MCNC: 3.2 G/DL (ref 3.8–4.9)
ALBUMIN/GLOB SERPL: 1.52 RATIO (ref 1.6–3.17)
ALP SERPL-CCNC: 88 U/L (ref 41–126)
ALT SERPL-CCNC: 18 U/L (ref 8–44)
ANION GAP SERPL CALC-SCNC: 10.9 MMOL/L (ref 4–12)
AST SERPL-CCNC: 17 U/L (ref 13–35)
BASOPHILS # BLD AUTO: 0.05 X 10*3/UL (ref 0–0.1)
BASOPHILS NFR BLD AUTO: 0.3 %
BUN SERPL-SCNC: 5.4 MG/DL (ref 9–27)
BUN/CREAT SERPL: 10.8 RATIO (ref 12–20)
CALCIUM SPEC-MCNC: 8.8 MG/DL (ref 8.7–10.3)
CHLORIDE SERPL-SCNC: 98 MMOL/L (ref 96–109)
CO2 SERPL-SCNC: 23.1 MMOL/L (ref 21.6–31.8)
EOSINOPHIL # BLD AUTO: 0 X 10*3/UL (ref 0.04–0.35)
EOSINOPHIL NFR BLD AUTO: 0 %
ERYTHROCYTE [DISTWIDTH] IN BLOOD BY AUTOMATED COUNT: 3.22 X 10*6/UL (ref 4.1–5.2)
ERYTHROCYTE [DISTWIDTH] IN BLOOD: 13.4 % (ref 11.5–14.5)
GLOBULIN SER CALC-MCNC: 2.1 G/DL (ref 1.6–3.3)
GLUCOSE SERPL-MCNC: 103 MG/DL (ref 70–110)
HCT VFR BLD AUTO: 32.3 % (ref 37.2–46.3)
HGB BLD-MCNC: 10.7 G/DL (ref 12–15)
IMM GRANULOCYTES BLD QL AUTO: 0.6 %
LYMPHOCYTES # SPEC AUTO: 1.61 X 10*3/UL (ref 0.9–5)
LYMPHOCYTES NFR SPEC AUTO: 11.1 %
MCH RBC QN AUTO: 33.2 PG (ref 27–32)
MCHC RBC AUTO-ENTMCNC: 33.1 G/DL (ref 32–37)
MCV RBC AUTO: 100.3 FL (ref 80–97)
MONOCYTES # BLD AUTO: 1.56 X 10*3/UL (ref 0.2–1)
MONOCYTES NFR BLD AUTO: 10.8 %
NEUTROPHILS # BLD AUTO: 11.14 X 10*3/UL (ref 1.8–7.7)
NEUTROPHILS NFR BLD AUTO: 77.2 %
NRBC BLD AUTO-RTO: 0 X 10*3/UL (ref 0–0.01)
PLATELET # BLD AUTO: 253 X 10*3/UL (ref 140–440)
POTASSIUM SERPL-SCNC: 3.9 MMOL/L (ref 3.5–5.5)
PROT SERPL-MCNC: 5.3 G/DL (ref 6.2–8.2)
SODIUM SERPL-SCNC: 132 MMOL/L (ref 135–145)
WBC # BLD AUTO: 14.45 X 10*3/UL (ref 4.5–10)

## 2024-07-12 NOTE — P.PN
Subjective


Progress Note Date: 07/12/24





HISTORY OF PRESENT ILLNESS:





This is an 79-year-old  female with a previous medical history signi

ficant for hypertension and hypertensive cardiovascular disease, hyperlipidemia,

history of osteoarthritis, spondylosis of the lumbar spine thoracic spine, 

history of osteopenia, patient was recently hospitalized at [facility] after she was admitted for severe abdominal pain associated with severe 

diarrhea she was diagnosed with C. difficile colitis, she was treated with 

vancomycin 125 mg orally 4 times every day for about 4 weeks, and she came to 

the office for evaluation and follow-up few days ago, and she was feeling a bit 

better, however she presented to the yesterday to the office with increased 

dizziness lightheadedness not able to keep anything down and severe diarrhea not

able to tolerate anything she has not been eating anything at this time, she 

patient was hypotensive she was referred to the emergency department for 

evaluation had a CT deferred toxins came back positive, she was started on IV 

fluid resuscitation, she was placed on vancomycin to 50 mg orally 4 times every 

day, consulted infectious disease to see if the patient is a candidate for 

Dificid 200 mg orally twice every day since she had failed the first management,

patient also will be started on Questran 4 g orally twice every day, she will 

place on probiotic as well, I will admit the patient to the hospital because of 

significant hypovolemic hyponatremia and significant orthostatic changes.





7/10: Patient is laying down in bed continues to have loose stool, better than 

he was yesterday, continues to have abdominal cramps associate with nausea but 

no vomiting, she continues to have a loose bowel movement every time she has 

anything to eat, she has no fever or chills at this time, she seems to be 

tolerating the new medication Dr. Masterson has switched her to Dificid 200 mg 

orally twice every day vancomycin was stopped, we will continue to follow-up the

patient very closely, I will discontinue IV fluid at this time, if her symptoms 

are not better we will obtain CT scan of the abdomen pelvis tomorrow morning if 

she is not getting better.





7/11: Patient is laying down in bed, she continues to have diarrhea, she 

continues to have increased abdominal pain, she underwent CT scan of the abdomen

pelvis that showed evidence of mild colitis, diverticulosis, possible 

perisplenic edema, minimal anasarca due to fluid overload, discontinue IV fluid,

give the patient Lasix 20 mg IV push x 1, continue Dificid 200 mg orally twice 

every day, continue Questran 4 g orally twice every day, avoid dairy products, 

avoid vegetables and fresh fruits, continue yogurt, monitor the patient very 

closely, discussed with her  and with her at the bedside.





7/12: Patient is feeling better today, she is less short of breath, her IV fluid

is Hep-Lock, she is ambulating a bit better today, she continues to have loose 

stool, better than yesterday, she is able to tolerate her diet, less abdominal 

cramping, will continue to monitor the patient very closely hopefully home in 

the next 24 hours, patient white count is down and we will continue to monitor 

CBC over the next 24 hours.








REVIEW OF SYSTEMS:





Constitutional: No documented fever, no chills, no night sweats.  No weight 

change. positive for  weakness, positive for fatigue or lethargy.  No daytime 

sleepiness.


EENT: No headache.  No blurred vision or double vision, no loss of vision.  No 

loss of Hearing, no ringing in the ears, no dizziness.  No nasal drainage or 

congestion.  No epistaxis.  No sore throat.


Lungs: No shortness of breath, no cough, no sputum production.  No wheezing.  

Reports dyspnea with activity.


Cardiovascular: No chest pain, no lower extremity edema.  No palpitations.  No 

paroxysmal nocturnal dyspnea.  No orthopnea.  No lightheadedness or dizziness.  

No syncopal episodes.


Abdominal: Reports abdominal pain.  positive for  nausea, vomiting.  positive 

for  diarrhea.  No constipation.  No bloody or tarry stools reports loss of 

appetite.


Genitourinary: No dysuria, increased frequency, urgency.  No urinary retention.


Musculoskeletal: No myalgias.  positive for  muscle weakness, positive for gait 

dysfunction, no frequent falls.  No back pain.  No neck pain.


Integumentary: right leg with scab   No rash or pruritus.  No unusual bruising. 

No change in hair or nails.


Neurologic: No aphasia. No facial droop. No change in mentation. No head injury.

No headache. No paralysis. No paresthesia.


Psychiatric: No depression.  No anxiety.  No mood swings.


Endocrine: No abnormal blood sugars.  No weight change.  








PHYSICAL EXAMINATION:





General: This is a an 79-year-old  female who is laying down in bed in 

no apparent distress.


HEENT: Head is atraumatic, normocephalic, pupils were equal round reactive to 

light and recommendation, extraocular muscle movement were intact, sclera 

nonicteric, conjunctivae were pale, mucous membranes of the mouth are somewhat 

dry.


Neck: Supple, no JVP, normal carotid upstroke bilaterally, no lymphadenopathy.


Chest: Decreased breath sounds at the bases, few rhonchi, no expiratory wheezes,

no chest wall tenderness, no intercostal retractions.


Heart: First heart sound is normal, second heart sound is normal there is 

systolic ejection murmur 2/6 located in the left sternal border.


Abdomen: Soft, mild tenderness to the left lower quadrant , nondistended, 

positive bowel sounds.


Extremities: There is no edema no calf tenderness DP +2 bilaterally, there is an

denuded hemorrhagic blister from the lower aspect of the right leg with an 

eschar on top of it.


Neurologic examination: Patient is awake alert and oriented x3, cranial nerves 

II-12 appear grossly intact, muscle power were 5 out of 5 in upper extremities 

and 5 out of 5 in bilateral lower extremities, deep tendon reflexes normal 

bilaterally.





ASSESSMENT AND PLAN:





1.  Recurrent C. difficile colitis with SIRS.  Continue patient on Dificid 200 

mg orally twice every day for the next 10 days, continue with Questran 4 g 

orally twice every day, monitor the patient symptoms very closely, continue 

current pain management with Tylenol and Motrin, CT scan of the abdomen and 

pelvis reviewed showed evidence of mild colitis, diverticulosis, anasarca.





2.  Hypovolemic hyponatremia.  Resolved.  





3.  Hypertension and hypertensive cardiovascular disease.  Continue patient on 

carvedilol 37.5 mg orally twice every day, continue patient on losartan 100 mg 

orally once every day.  Monitor the patient blood pressure very closely.





4.  Mixed hyperlipidemia.  Continue patient on rosuvastatin 10 mg once every 

day, monitor the patient lipid panel, keep LDL 55-70.





5.  Osteopenia.  Continue patient on alendronate 70 mg once every week, continue

calcium 1200 mg once every day, continue vitamin D3 2000 unit once every day.  

Patient is up-to-date on her DEXA scan.





6.  Vitamin D deficiency.  Continue vitamin D3 2000 units once every day.





7.  Osteoarthritis.  Continue current pain management.





8.  DVT prophylaxis.  Start the patient on Lovenox 40 mg subcutaneous every 24 

hours.  Bilateral knee-high IVY hose.





9.  GI prophylaxis.  Continue patient on famotidine 20 mg at bedtime.





10.  Physical therapy evaluation.





11.  Fluid overload.  Patient was taken off IV fluid, patient was given Lasix 20

mg IV push yesterday.





12.  Hopefully home tomorrow morning.








Objective





- Vital Signs


Vital signs: 


                                   Vital Signs











Temp  97.5 F L  07/12/24 06:52


 


Pulse  85   07/12/24 06:52


 


Resp  18   07/12/24 06:52


 


BP  165/83   07/12/24 06:52


 


Pulse Ox  95   07/12/24 06:52


 


FiO2      








                                 Intake & Output











 07/11/24 07/12/24 07/12/24





 18:59 06:59 18:59


 


Intake Total 400  


 


Balance 400  


 


Intake:   


 


  Intake, IV Titration 400  





  Amount   


 


    Sodium Chloride 0.9% 1, 400  





    000 ml @ 50 mls/hr IV .   





    Q20H [facility] Rx#:[id number]   


 


Other:   


 


  Voiding Method  Toilet 


 


  # Voids  2 














- Labs


CBC & Chem 7: 


                                 07/12/24 06:07





                                 07/12/24 06:07


Labs: 


                  Abnormal Lab Results - Last 24 Hours (Table)











  07/12/24 07/12/24 Range/Units





  06:07 06:07 


 


WBC  14.45 H   (4.50-10.00)  X 10*3/uL


 


RBC  3.22 L   (4.10-5.20)  X 10*6/uL


 


Hgb  10.7 L   (12.0-15.0)  g/dL


 


Hct  32.3 L   (37.2-46.3)  %


 


MCV  100.3 H   (80.0-97.0)  FL


 


MCH  33.2 H   (27.0-32.0)  pg


 


Immature Gran #  0.09 H   (0.00-0.04)  X 10*3/uL


 


Neutrophils #  11.14 H   (1.80-7.70)  X 10*3/uL


 


Monocytes #  1.56 H   (0.20-1.00)  X 10*3/uL


 


Eosinophils #  0 L   (0.04-0.35)  X 10*3/uL


 


Sodium   132 L  (135-145)  mmol/L


 


BUN   5.4 L  (9.0-27.0)  mg/dL


 


Creatinine   0.5 L  (0.6-1.5)  mg/dL


 


BUN/Creatinine Ratio   10.80 L  (12.00-20.00)  Ratio


 


Total Bilirubin   0.2 L  (0.3-1.2)  mg/dL


 


C-Reactive Protein   13.30 H  (0.00-0.80)  mg/dL


 


Total Protein   5.3 L  (6.2-8.2)  g/dL


 


Albumin   3.2 L  (3.8-4.9)  g/dL


 


Albumin/Globulin Ratio   1.52 L  (1.60-3.17)  Ratio








                      Microbiology - Last 24 Hours (Table)











 07/08/24 18:11 Blood Culture - Preliminary





 Blood

## 2024-07-12 NOTE — P.PN
Subjective


Progress Note Date: 07/12/24


Principal diagnosis: 





Reason for follow-up is C. difficile colitis





Patient is a 79-year-old  female with a past medical history 

significant for hypertension recently exposed antibiotic for the right lower 

extremity laceration subsequent developing C. difficile colitis that has been 

treated with oral vancomycin with persistent symptoms the patient has been 

admitted to the hospital.


On today's evaluation that is 07/12/2024, Patient is afebrile patient is 

currently on room air and denies having any shortness of breath, the patient 

denies any chest pain or cough, the patient did have some nausea but no vomiting

still, some crampy abdominal pain with bowel movements diarrhea frequency has 

decreased but still having loose stool no blood or mucus in the stool.


Patient white.  14.45, creatinine 0.5 patient did have abdominal pelvis CT last 

evening which did show some mild segmental colonic wall thickening mild colitis





Objective





- Vital Signs


Vital signs: 


                                   Vital Signs











Temp  97.5 F L  07/12/24 06:52


 


Pulse  85   07/12/24 06:52


 


Resp  18   07/12/24 06:52


 


BP  165/83   07/12/24 06:52


 


Pulse Ox  95   07/12/24 06:52


 


FiO2      








                                 Intake & Output











 07/11/24 07/12/24 07/12/24





 18:59 06:59 18:59


 


Intake Total 400  


 


Balance 400  


 


Intake:   


 


  Intake, IV Titration 400  





  Amount   


 


    Sodium Chloride 0.9% 1, 400  





    000 ml @ 50 mls/hr IV .   





    Q20H [facility] Rx#:[id number]   


 


Other:   


 


  Voiding Method  Toilet 


 


  # Voids  2 














- Exam





GENERAL DESCRIPTION: An elderly female lying in bed in no distress





RESPIRATORY SYSTEM: Unlabored breathing , decreased breath sounds at bases





HEART: S1 S2 regular rate and rhythm ,





ABDOMEN: Soft , no tenderness





EXTREMITIES: No edema feet





- Labs


CBC & Chem 7: 


                                 07/12/24 06:07





                                 07/12/24 06:07


Labs: 


                  Abnormal Lab Results - Last 24 Hours (Table)











  07/12/24 07/12/24 Range/Units





  06:07 06:07 


 


WBC  14.45 H   (4.50-10.00)  X 10*3/uL


 


RBC  3.22 L   (4.10-5.20)  X 10*6/uL


 


Hgb  10.7 L   (12.0-15.0)  g/dL


 


Hct  32.3 L   (37.2-46.3)  %


 


MCV  100.3 H   (80.0-97.0)  FL


 


MCH  33.2 H   (27.0-32.0)  pg


 


Immature Gran #  0.09 H   (0.00-0.04)  X 10*3/uL


 


Neutrophils #  11.14 H   (1.80-7.70)  X 10*3/uL


 


Monocytes #  1.56 H   (0.20-1.00)  X 10*3/uL


 


Eosinophils #  0 L   (0.04-0.35)  X 10*3/uL


 


Sodium   132 L  (135-145)  mmol/L


 


BUN   5.4 L  (9.0-27.0)  mg/dL


 


Creatinine   0.5 L  (0.6-1.5)  mg/dL


 


BUN/Creatinine Ratio   10.80 L  (12.00-20.00)  Ratio


 


Total Bilirubin   0.2 L  (0.3-1.2)  mg/dL


 


C-Reactive Protein   13.30 H  (0.00-0.80)  mg/dL


 


Total Protein   5.3 L  (6.2-8.2)  g/dL


 


Albumin   3.2 L  (3.8-4.9)  g/dL


 


Albumin/Globulin Ratio   1.52 L  (1.60-3.17)  Ratio








                      Microbiology - Last 24 Hours (Table)











 07/08/24 18:11 Blood Culture - Preliminary





 Blood 














Assessment and Plan


(1) C. difficile colitis


Current Visit: Yes   Status: Acute   Code(s): A04.72 - ENTEROCOLITIS D/T CLOSTR

IDIUM DIFFICILE, NOT SPCF AS RECUR   SNOMED Code(s): 416390879


   





(2) Leukocytosis


Current Visit: Yes   Status: Acute   Code(s): D72.829 - ELEVATED WHITE BLOOD 

CELL COUNT, UNSPECIFIED   SNOMED Code(s): 797960988


   


Plan: 





1patient presented hospital with severe diarrhea that has been going on for the

last few weeks and apparently the patient has failed outpatient oral vancomycin 

therapy


2-patient diarrhea  frequency slightly increased the patient white count is 

trending down, CT abdominal pelvis CT shows mild colitis we will continue 

patient on Dificid and Questran encouraged to increase her probiotic and yogurt 

intake questions answered


Dictation was produced using dragon dictation software. please excuse any 

grammatical, word or spelling errors. 








Time with Patient: Less than 30

## 2024-07-13 LAB
ANION GAP SERPL CALC-SCNC: 10.5 MMOL/L (ref 4–12)
BASOPHILS # BLD AUTO: 0.05 X 10*3/UL (ref 0–0.1)
BASOPHILS NFR BLD AUTO: 0.4 %
BUN SERPL-SCNC: 6.3 MG/DL (ref 9–27)
BUN/CREAT SERPL: 15.75 RATIO (ref 12–20)
CALCIUM SPEC-MCNC: 9.1 MG/DL (ref 8.7–10.3)
CHLORIDE SERPL-SCNC: 98 MMOL/L (ref 96–109)
CO2 SERPL-SCNC: 23.5 MMOL/L (ref 21.6–31.8)
EOSINOPHIL # BLD AUTO: 0.24 X 10*3/UL (ref 0.04–0.35)
EOSINOPHIL NFR BLD AUTO: 1.7 %
ERYTHROCYTE [DISTWIDTH] IN BLOOD BY AUTOMATED COUNT: 3.05 X 10*6/UL (ref 4.1–5.2)
ERYTHROCYTE [DISTWIDTH] IN BLOOD: 13.6 % (ref 11.5–14.5)
GLUCOSE SERPL-MCNC: 104 MG/DL (ref 70–110)
HCT VFR BLD AUTO: 30.9 % (ref 37.2–46.3)
HGB BLD-MCNC: 10.4 G/DL (ref 12–15)
IMM GRANULOCYTES BLD QL AUTO: 0.5 %
LYMPHOCYTES # SPEC AUTO: 2.18 X 10*3/UL (ref 0.9–5)
LYMPHOCYTES NFR SPEC AUTO: 15.8 %
MCH RBC QN AUTO: 34.1 PG (ref 27–32)
MCHC RBC AUTO-ENTMCNC: 33.7 G/DL (ref 32–37)
MCV RBC AUTO: 101.3 FL (ref 80–97)
MONOCYTES # BLD AUTO: 1.72 X 10*3/UL (ref 0.2–1)
MONOCYTES NFR BLD AUTO: 12.5 %
NEUTROPHILS # BLD AUTO: 9.51 X 10*3/UL (ref 1.8–7.7)
NEUTROPHILS NFR BLD AUTO: 69.1 %
NRBC BLD AUTO-RTO: 0 X 10*3/UL (ref 0–0.01)
PLATELET # BLD AUTO: 275 X 10*3/UL (ref 140–440)
POTASSIUM SERPL-SCNC: 3.8 MMOL/L (ref 3.5–5.5)
SODIUM SERPL-SCNC: 132 MMOL/L (ref 135–145)
WBC # BLD AUTO: 13.77 X 10*3/UL (ref 4.5–10)

## 2024-07-13 RX ADMIN — BUTALBITAL, ACETAMINOPHEN, AND CAFFEINE PRN EACH: 50; 325; 40 TABLET ORAL at 11:00

## 2024-07-13 NOTE — P.PN
Subjective


Progress Note Date: 07/13/24





HISTORY OF PRESENT ILLNESS:





This is an 79-year-old  female with a previous medical history signi

ficant for hypertension and hypertensive cardiovascular disease, hyperlipidemia,

history of osteoarthritis, spondylosis of the lumbar spine thoracic spine, 

history of osteopenia, patient was recently hospitalized at [facility] after she was admitted for severe abdominal pain associated with severe 

diarrhea she was diagnosed with C. difficile colitis, she was treated with 

vancomycin 125 mg orally 4 times every day for about 4 weeks, and she came to 

the office for evaluation and follow-up few days ago, and she was feeling a bit 

better, however she presented to the yesterday to the office with increased 

dizziness lightheadedness not able to keep anything down and severe diarrhea not

able to tolerate anything she has not been eating anything at this time, she 

patient was hypotensive she was referred to the emergency department for 

evaluation had a CT deferred toxins came back positive, she was started on IV 

fluid resuscitation, she was placed on vancomycin to 50 mg orally 4 times every 

day, consulted infectious disease to see if the patient is a candidate for 

Dificid 200 mg orally twice every day since she had failed the first management,

patient also will be started on Questran 4 g orally twice every day, she will 

place on probiotic as well, I will admit the patient to the hospital because of 

significant hypovolemic hyponatremia and significant orthostatic changes.





7/10: Patient is laying down in bed continues to have loose stool, better than 

he was yesterday, continues to have abdominal cramps associate with nausea but 

no vomiting, she continues to have a loose bowel movement every time she has 

anything to eat, she has no fever or chills at this time, she seems to be 

tolerating the new medication Dr. Masterson has switched her to Dificid 200 mg 

orally twice every day vancomycin was stopped, we will continue to follow-up the

patient very closely, I will discontinue IV fluid at this time, if her symptoms 

are not better we will obtain CT scan of the abdomen pelvis tomorrow morning if 

she is not getting better.





7/11: Patient is laying down in bed, she continues to have diarrhea, she 

continues to have increased abdominal pain, she underwent CT scan of the abdomen

pelvis that showed evidence of mild colitis, diverticulosis, possible 

perisplenic edema, minimal anasarca due to fluid overload, discontinue IV fluid,

give the patient Lasix 20 mg IV push x 1, continue Dificid 200 mg orally twice 

every day, continue Questran 4 g orally twice every day, avoid dairy products, 

avoid vegetables and fresh fruits, continue yogurt, monitor the patient very 

closely, discussed with her  and with her at the bedside.





7/12: Patient is feeling better today, she is less short of breath, her IV fluid

is Hep-Lock, she is ambulating a bit better today, she continues to have loose 

stool, better than yesterday, she is able to tolerate her diet, less abdominal 

cramping, will continue to monitor the patient very closely hopefully home in 

the next 24 hours, patient white count is down and we will continue to monitor 

CBC over the next 24 hours.





7/13: Patient is feeling much better today, has not had any diarrhea today, she 

is tolerating her breakfast very well, white count still pending, continue with 

Dificid 200 mg orally twice every day, continue Questran 4 g orally twice every 

day, repeat her labs tomorrow morning, likely she will be able to be discharged 

home tomorrow morning, her headaches are about the same she is likely suffering 

from occipital neuralgia, I will start the patient on Fioricet 1 tablet every 4 

hours as needed discontinue Tylenol continue Motrin as needed, increase 

activity, hopefully home in the next 1 to 2 days.








REVIEW OF SYSTEMS:





Constitutional: No documented fever, no chills, no night sweats.  No weight 

change. positive for  weakness, positive for fatigue or lethargy.  No daytime 

sleepiness.


EENT: No headache.  No blurred vision or double vision, no loss of vision.  No 

loss of Hearing, no ringing in the ears, no dizziness.  No nasal drainage or 

congestion.  No epistaxis.  No sore throat.


Lungs: No shortness of breath, no cough, no sputum production.  No wheezing.  

Reports dyspnea with activity.


Cardiovascular: No chest pain, no lower extremity edema.  No palpitations.  No 

paroxysmal nocturnal dyspnea.  No orthopnea.  No lightheadedness or dizziness.  

No syncopal episodes.


Abdominal: Reports no  abdominal pain.  no  nausea, vomiting.  no diarrhea.  No 

constipation.  No bloody or tarry stools reports loss of appetite.


Genitourinary: No dysuria, increased frequency, urgency.  No urinary retention.


Musculoskeletal: No myalgias.  positive for  muscle weakness, positive for gait 

dysfunction, no frequent falls.  No back pain.  No neck pain.


Integumentary: right leg with scab   No rash or pruritus.  No unusual bruising. 

No change in hair or nails.


Neurologic: No aphasia. No facial droop. No change in mentation. No head injury.

positive for headache. No paralysis. No paresthesia.


Psychiatric: No depression.  No anxiety.  No mood swings.


Endocrine: No abnormal blood sugars.  No weight change.  








PHYSICAL EXAMINATION:





General: This is a an 79-year-old  female who is laying down in bed in 

no apparent distress.


HEENT: Head is atraumatic, normocephalic, pupils were equal round reactive to 

light and recommendation, extraocular muscle movement were intact, sclera 

nonicteric, conjunctivae were pale, mucous membranes of the mouth are somewhat 

dry.


Neck: Supple, no JVP, normal carotid upstroke bilaterally, no lymphadenopathy.


Chest: Decreased breath sounds at the bases, few rhonchi, no expiratory wheezes,

no chest wall tenderness, no intercostal retractions.


Heart: First heart sound is normal, second heart sound is normal there is 

systolic ejection murmur 2/6 located in the left sternal border.


Abdomen: Soft, mild tenderness to the left lower quadrant , nondistended, 

positive bowel sounds.


Extremities: There is no edema no calf tenderness DP +2 bilaterally, there is an

denuded hemorrhagic blister from the lower aspect of the right leg with an 

eschar on top of it.


Neurologic examination: Patient is awake alert and oriented x3, cranial nerves 

II-12 appear grossly intact, muscle power were 5 out of 5 in upper extremities 

and 5 out of 5 in bilateral lower extremities, deep tendon reflexes normal 

bilaterally.





ASSESSMENT AND PLAN:





1.  Recurrent C. difficile colitis with SIRS.  Continue patient on Dificid 200 

mg orally twice every day for  10 days, continue with Questran 4 g orally twice 

every day, monitor the patient symptoms very closely.





2.  Hypovolemic hyponatremia.  Stable





3.  Hypertension and hypertensive cardiovascular disease.  Continue patient on 

carvedilol 37.5 mg orally twice every day, continue patient on losartan 100 mg 

orally once every day.  Monitor the patient blood pressure very closely.





4.  Mixed hyperlipidemia.  Continue patient on rosuvastatin 10 mg once every 

day, monitor the patient lipid panel, keep LDL 55-70.





5.  Osteopenia.  Continue patient on alendronate 70 mg once every week, continue

calcium 1200 mg once every day, continue vitamin D3 2000 unit once every day.  

Patient is up-to-date on her DEXA scan.





6.  Vitamin D deficiency.  Continue vitamin D3 2000 units once every day.





7.  Osteoarthritis.  Continue current pain management.





8.  DVT prophylaxis.  on Lovenox 40 mg subcutaneous every 24 hours.  Bilateral 

knee-high IVY hose.





9.  GI prophylaxis.  Continue patient on famotidine 20 mg at bedtime.





10.  Occipital neuralgia.  Started the patient on Fioricet 1 capsule every 4 

hours, as needed discontinue Tylenol continue Motrin as needed, she may need to 

have an occipital block as an outpatient.





11.  Increase activity.





12.  Home in 1 to 2 days.








Objective





- Vital Signs


Vital signs: 


                                   Vital Signs











Temp  98.3 F   07/13/24 08:06


 


Pulse  82   07/13/24 08:06


 


Resp  17   07/13/24 08:06


 


BP  106/65   07/13/24 08:06


 


Pulse Ox  96   07/13/24 08:06


 


FiO2      








                                 Intake & Output











 07/12/24 07/13/24 07/13/24





 18:59 06:59 18:59


 


Weight 62.142 kg  


 


Other:   


 


  # Voids 3 2 














- Labs


CBC & Chem 7: 


                                 07/12/24 06:07





                                 07/13/24 05:53


Labs: 


                  Abnormal Lab Results - Last 24 Hours (Table)











  07/13/24 Range/Units





  05:53 


 


Sodium  132 L  (135-145)  mmol/L


 


BUN  6.3 L  (9.0-27.0)  mg/dL


 


Creatinine  0.4 L  (0.6-1.5)  mg/dL

## 2024-07-13 NOTE — P.PN
Subjective


Progress Note Date: 07/13/24


Principal diagnosis: 





Reason for follow-up is C. difficile colitis





Patient is a 79-year-old  female with a past medical history 

significant for hypertension recently exposed antibiotic for the right lower 

extremity laceration subsequent developing C. difficile colitis that has been 

treated with oral vancomycin with persistent symptoms the patient has been 

admitted to the hospital.


On today's evaluation that is 07/13/2024, patient has been afebrile, patient is 

breathing comfortably and is currently on room air, patient denies having any 

significant cough no chest pain shortness of breath, patient denies nausea 

vomiting improvement with abdominal pain and did have resolution of diarrhea 

with no bowel movement since yesterday 3 PM.


Patient white count is down to 13.77 creatinine 0.4








Objective





- Vital Signs


Vital signs: 


                                   Vital Signs











Temp  98.3 F   07/13/24 08:06


 


Pulse  82   07/13/24 08:06


 


Resp  17   07/13/24 08:06


 


BP  106/65   07/13/24 08:06


 


Pulse Ox  96   07/13/24 08:06


 


FiO2      








                                 Intake & Output











 07/12/24 07/13/24 07/13/24





 18:59 06:59 18:59


 


Weight 62.142 kg  


 


Other:   


 


  # Voids 3 2 














- Exam





GENERAL DESCRIPTION: An elderly female lying in bed in no distress





RESPIRATORY SYSTEM: Unlabored breathing , decreased breath sounds at bases





HEART: S1 S2 regular rate and rhythm ,





ABDOMEN: Soft , no tenderness





EXTREMITIES: No edema feet





- Labs


CBC & Chem 7: 


                                 07/13/24 05:53





                                 07/13/24 05:53


Labs: 


                  Abnormal Lab Results - Last 24 Hours (Table)











  07/13/24 Range/Units





  05:53 


 


Sodium  132 L  (135-145)  mmol/L


 


BUN  6.3 L  (9.0-27.0)  mg/dL


 


Creatinine  0.4 L  (0.6-1.5)  mg/dL














Assessment and Plan


(1) C. difficile colitis


Current Visit: Yes   Status: Acute   Code(s): A04.72 - ENTEROCOLITIS D/T 

CLOSTRIDIUM DIFFICILE, NOT SPCF AS RECUR   SNOMED Code(s): 714869443


   





(2) Leukocytosis


Current Visit: Yes   Status: Acute   Code(s): D72.829 - ELEVATED WHITE BLOOD 

CELL COUNT, UNSPECIFIED   SNOMED Code(s): 067597651


   


Plan: 





1patient presented hospital with severe diarrhea that has been going on for the

last few weeks and apparently the patient has failed outpatient oral vancomycin 

therapy


2-patient did have resolution of her diarrhea and white count is trending down 

we will continue the patient on Dificid however discontinue Questran encouraged 

to increase her probiotic and yogurt intake


Dictation was produced using dragon dictation software. please excuse any 

grammatical, word or spelling errors. 








Time with Patient: Less than 30

## 2024-07-14 LAB
ANION GAP SERPL CALC-SCNC: 12.2 MMOL/L (ref 4–12)
BASOPHILS # BLD AUTO: 0.05 X 10*3/UL (ref 0–0.1)
BASOPHILS NFR BLD AUTO: 0.5 %
BUN SERPL-SCNC: 8.4 MG/DL (ref 9–27)
BUN/CREAT SERPL: 16.8 RATIO (ref 12–20)
CALCIUM SPEC-MCNC: 9 MG/DL (ref 8.7–10.3)
CHLORIDE SERPL-SCNC: 97 MMOL/L (ref 96–109)
CO2 SERPL-SCNC: 23.8 MMOL/L (ref 21.6–31.8)
EOSINOPHIL # BLD AUTO: 0.33 X 10*3/UL (ref 0.04–0.35)
EOSINOPHIL NFR BLD AUTO: 3 %
ERYTHROCYTE [DISTWIDTH] IN BLOOD BY AUTOMATED COUNT: 2.97 X 10*6/UL (ref 4.1–5.2)
ERYTHROCYTE [DISTWIDTH] IN BLOOD: 13.7 % (ref 11.5–14.5)
GLUCOSE SERPL-MCNC: 103 MG/DL (ref 70–110)
HCT VFR BLD AUTO: 30 % (ref 37.2–46.3)
HGB BLD-MCNC: 10.4 G/DL (ref 12–15)
IMM GRANULOCYTES BLD QL AUTO: 0.9 %
LYMPHOCYTES # SPEC AUTO: 2.86 X 10*3/UL (ref 0.9–5)
LYMPHOCYTES NFR SPEC AUTO: 25.7 %
MCH RBC QN AUTO: 35 PG (ref 27–32)
MCHC RBC AUTO-ENTMCNC: 34.7 G/DL (ref 32–37)
MCV RBC AUTO: 101 FL (ref 80–97)
MONOCYTES # BLD AUTO: 1.36 X 10*3/UL (ref 0.2–1)
MONOCYTES NFR BLD AUTO: 12.2 %
NEUTROPHILS # BLD AUTO: 6.41 X 10*3/UL (ref 1.8–7.7)
NEUTROPHILS NFR BLD AUTO: 57.7 %
NRBC BLD AUTO-RTO: 0 X 10*3/UL (ref 0–0.01)
PLATELET # BLD AUTO: 313 X 10*3/UL (ref 140–440)
POTASSIUM SERPL-SCNC: 4.1 MMOL/L (ref 3.5–5.5)
SODIUM SERPL-SCNC: 133 MMOL/L (ref 135–145)
WBC # BLD AUTO: 11.11 X 10*3/UL (ref 4.5–10)

## 2024-07-14 RX ADMIN — ASPIRIN SCH: 325 TABLET ORAL at 08:02

## 2024-07-14 NOTE — P.PN
Subjective


Progress Note Date: 07/14/24


Principal diagnosis: 





Reason for follow-up is C. difficile colitis





Patient is a 79-year-old  female with a past medical history 

significant for hypertension recently exposed antibiotic for the right lower 

extremity laceration subsequent developing C. difficile colitis that has been 

treated with oral vancomycin with persistent symptoms the patient has been 

admitted to the hospital.


On today's evaluation that is 07/14/2024, Patient is afebrile this morning and  

denies any chills, patient mention breathing comfortably and is currently on 

room air, patient denies any chest pain occasional cough patient denies any 

abdominal pain did have resolution of her diarrhea and a formed bowel movement 

this morning no nausea no vomiting.


Patient white count is down to 11.1 creatinine 0.5 blood culture negative








Objective





- Vital Signs


Vital signs: 


                                   Vital Signs











Temp  98.1 F   07/14/24 07:28


 


Pulse  75   07/14/24 07:28


 


Resp  16   07/14/24 07:28


 


BP  146/83   07/14/24 07:28


 


Pulse Ox  96   07/14/24 07:28


 


FiO2      








                                 Intake & Output











 07/13/24 07/14/24 07/14/24





 18:59 06:59 18:59


 


Other:   


 


  Voiding Method  Toilet Toilet


 


  # Voids 1 6 














- Exam





GENERAL DESCRIPTION: An elderly female up in the chair in no distress





RESPIRATORY SYSTEM: Unlabored breathing , decreased breath sounds at bases





HEART: S1 S2 regular rate and rhythm ,





ABDOMEN: Soft , no tenderness





EXTREMITIES: No edema feet





- Labs


CBC & Chem 7: 


                                 07/14/24 03:40





                                 07/14/24 03:40


Labs: 


                  Abnormal Lab Results - Last 24 Hours (Table)











  07/13/24 07/14/24 07/14/24 Range/Units





  05:53 03:40 03:40 


 


WBC  13.77 H  11.11 H   (4.50-10.00)  X 10*3/uL


 


RBC  3.05 L  2.97 L   (4.10-5.20)  X 10*6/uL


 


Hgb  10.4 L  10.4 L   (12.0-15.0)  g/dL


 


Hct  30.9 L  30.0 L   (37.2-46.3)  %


 


MCV  101.3 H  101.0 H   (80.0-97.0)  FL


 


MCH  34.1 H  35.0 H   (27.0-32.0)  pg


 


Immature Gran #  0.07 H  0.10 H   (0.00-0.04)  X 10*3/uL


 


Neutrophils #  9.51 H    (1.80-7.70)  X 10*3/uL


 


Monocytes #  1.72 H  1.36 H   (0.20-1.00)  X 10*3/uL


 


Sodium    133 L  (135-145)  mmol/L


 


Anion Gap    12.20 H  (4.00-12.00)  mmol/L


 


BUN    8.4 L  (9.0-27.0)  mg/dL


 


Creatinine    0.5 L  (0.6-1.5)  mg/dL








                      Microbiology - Last 24 Hours (Table)











 07/08/24 18:11 Blood Culture - Final





 Blood 














Assessment and Plan


(1) C. difficile colitis


Current Visit: Yes   Status: Acute   Code(s): A04.72 - ENTEROCOLITIS D/T 

CLOSTRIDIUM DIFFICILE, NOT SPCF AS RECUR   SNOMED Code(s): 602021949


   





(2) Leukocytosis


Current Visit: Yes   Status: Acute   Code(s): D72.829 - ELEVATED WHITE BLOOD 

CELL COUNT, UNSPECIFIED   SNOMED Code(s): 248224195


   


Plan: 





1patient presented hospital with severe diarrhea that has been going on for the

last few weeks and apparently the patient has failed outpatient oral vancomycin 

therapy


2-patient did have resolution of her diarrhea and white count is has almost 

normalized we will keep the patient on Dificid and monitor clinical course 

closely


Question concerns were answered


Dictation was produced using dragon dictation software. please excuse any 

grammatical, word or spelling errors. 








Time with Patient: Less than 30

## 2024-07-14 NOTE — P.PN
Subjective


Progress Note Date: 07/14/24





HISTORY OF PRESENT ILLNESS:





This is an 79-year-old  female with a previous medical history signi

ficant for hypertension and hypertensive cardiovascular disease, hyperlipidemia,

history of osteoarthritis, spondylosis of the lumbar spine thoracic spine, 

history of osteopenia, patient was recently hospitalized at [facility] after she was admitted for severe abdominal pain associated with severe 

diarrhea she was diagnosed with C. difficile colitis, she was treated with 

vancomycin 125 mg orally 4 times every day for about 4 weeks, and she came to 

the office for evaluation and follow-up few days ago, and she was feeling a bit 

better, however she presented to the yesterday to the office with increased 

dizziness lightheadedness not able to keep anything down and severe diarrhea not

able to tolerate anything she has not been eating anything at this time, she 

patient was hypotensive she was referred to the emergency department for 

evaluation had a CT deferred toxins came back positive, she was started on IV 

fluid resuscitation, she was placed on vancomycin to 50 mg orally 4 times every 

day, consulted infectious disease to see if the patient is a candidate for 

Dificid 200 mg orally twice every day since she had failed the first management,

patient also will be started on Questran 4 g orally twice every day, she will 

place on probiotic as well, I will admit the patient to the hospital because of 

significant hypovolemic hyponatremia and significant orthostatic changes.





7/10: Patient is laying down in bed continues to have loose stool, better than 

he was yesterday, continues to have abdominal cramps associate with nausea but 

no vomiting, she continues to have a loose bowel movement every time she has 

anything to eat, she has no fever or chills at this time, she seems to be 

tolerating the new medication Dr. Masterson has switched her to Dificid 200 mg 

orally twice every day vancomycin was stopped, we will continue to follow-up the

patient very closely, I will discontinue IV fluid at this time, if her symptoms 

are not better we will obtain CT scan of the abdomen pelvis tomorrow morning if 

she is not getting better.





7/11: Patient is laying down in bed, she continues to have diarrhea, she 

continues to have increased abdominal pain, she underwent CT scan of the abdomen

pelvis that showed evidence of mild colitis, diverticulosis, possible 

perisplenic edema, minimal anasarca due to fluid overload, discontinue IV fluid,

give the patient Lasix 20 mg IV push x 1, continue Dificid 200 mg orally twice 

every day, continue Questran 4 g orally twice every day, avoid dairy products, 

avoid vegetables and fresh fruits, continue yogurt, monitor the patient very 

closely, discussed with her  and with her at the bedside.





7/12: Patient is feeling better today, she is less short of breath, her IV fluid

is Hep-Lock, she is ambulating a bit better today, she continues to have loose 

stool, better than yesterday, she is able to tolerate her diet, less abdominal 

cramping, will continue to monitor the patient very closely hopefully home in 

the next 24 hours, patient white count is down and we will continue to monitor 

CBC over the next 24 hours.





7/13: Patient is feeling much better today, has not had any diarrhea today, she 

is tolerating her breakfast very well, white count still pending, continue with 

Dificid 200 mg orally twice every day, continue Questran 4 g orally twice every 

day, repeat her labs tomorrow morning, likely she will be able to be discharged 

home tomorrow morning, her headaches are about the same she is likely suffering 

from occipital neuralgia, I will start the patient on Fioricet 1 tablet every 4 

hours as needed discontinue Tylenol continue Motrin as needed, increase 

activity, hopefully home in the next 1 to 2 days.





7/14: Patient is sitting up in bed that she is feeling better today she 

continues to have some cramps in the lower abdomen, her stool is not soft 

anymore, she has a good bowel movement today, she has no fever or chills at this

time, she was taken off Questran by infectious disease yesterday, increase oral 

intake of fluid, increase oral intake of food, patient will likely be discharged

home tomorrow morning.








REVIEW OF SYSTEMS:





Constitutional: No documented fever, no chills, no night sweats.  No weight braxton

ge. positive for  weakness, positive for fatigue or lethargy.  No daytime 

sleepiness.


EENT: No headache.  No blurred vision or double vision, no loss of vision.  No 

loss of Hearing, no ringing in the ears, no dizziness.  No nasal drainage or 

congestion.  No epistaxis.  No sore throat.


Lungs: No shortness of breath, no cough, no sputum production.  No wheezing.  

Reports dyspnea with activity.


Cardiovascular: No chest pain, no lower extremity edema.  No palpitations.  No 

paroxysmal nocturnal dyspnea.  No orthopnea.  No lightheadedness or dizziness.  

No syncopal episodes.


Abdominal: Reports  abdominal pain.  no  nausea, vomiting.  no diarrhea.  No 

constipation.  No bloody or tarry stools reports loss of appetite.


Genitourinary: No dysuria, increased frequency, urgency.  No urinary retention.


Musculoskeletal: No myalgias.  positive for  muscle weakness, no gait 

dysfunction, no frequent falls.  positive for back pain. positive for neck pain.


Integumentary: right leg with scab   No rash or pruritus.  No unusual bruising. 

No change in hair or nails.


Neurologic: No aphasia. No facial droop. No change in mentation. No head injury.

positive for headache. No paralysis. No paresthesia.


Psychiatric: mild  depression.  No anxiety.  No mood swings.


Endocrine: No abnormal blood sugars.  No weight change.  








PHYSICAL EXAMINATION:





General: This is a an 79-year-old  female who is laying down in bed in 

no apparent distress.


HEENT: Head is atraumatic, normocephalic, pupils were equal round reactive to 

light and recommendation, extraocular muscle movement were intact, sclera 

nonicteric, conjunctivae were pale, mucous membranes of the mouth are somewhat 

dry.


Neck: Supple, no JVP, normal carotid upstroke bilaterally, no lymphadenopathy.


Chest: Decreased breath sounds at the bases, few rhonchi, no expiratory wheezes,

no chest wall tenderness, no intercostal retractions.


Heart: First heart sound is normal, second heart sound is normal there is 

systolic ejection murmur 2/6 located in the left sternal border.


Abdomen: Soft, mild tenderness to the left lower quadrant , nondistended, 

positive bowel sounds.


Extremities: There is no edema no calf tenderness DP +2 bilaterally, there is an

denuded hemorrhagic blister from the lower aspect of the right leg with an 

eschar on top of it.


Neurologic examination: Patient is awake alert and oriented x3, cranial nerves 

II-12 appear grossly intact, muscle power were 5 out of 5 in upper extremities 

and 5 out of 5 in bilateral lower extremities, deep tendon reflexes normal 

bilaterally.





ASSESSMENT AND PLAN:





1.  Recurrent C. difficile colitis with SIRS.  Continue patient on Dificid 200 

mg orally twice every day for  10 days, with yogurt.  Increase activity.





2.  Hypovolemic hyponatremia.  Stable





3.  Hypertension and hypertensive cardiovascular disease.  Continue patient on 

carvedilol 37.5 mg orally twice every day, continue patient on losartan 100 mg 

orally once every day.  Monitor the patient blood pressure very closely.





4.  Mixed hyperlipidemia.  Continue patient on rosuvastatin 10 mg once every 

day, monitor the patient lipid panel, keep LDL 55-70.





5.  Osteopenia.  Continue patient on alendronate 70 mg once every week, continue

calcium 1200 mg once every day, continue vitamin D3 2000 unit once every day.  

Patient is up-to-date on her DEXA scan.





6.  Vitamin D deficiency.  Continue vitamin D3 2000 units once every day.





7.  Osteoarthritis.  Continue current pain management.





8.  DVT prophylaxis.  on Lovenox 40 mg subcutaneous every 24 hours.  Bilateral 

knee-high IVY hose.





9.  GI prophylaxis.  Continue patient on famotidine 20 mg at bedtime.





10.  Occipital neuralgia. on Fioricet 1 capsule every 4 hours, as needed 

discontinue Tylenol continue Motrin as needed, she may need to have an occipital

block as an outpatient.





11.  Increase activity.





12.  Patient can be discharged home tomorrow morning.








Objective





- Vital Signs


Vital signs: 


                                   Vital Signs











Temp  98.1 F   07/14/24 07:28


 


Pulse  75   07/14/24 07:28


 


Resp  16   07/14/24 07:28


 


BP  146/83   07/14/24 07:28


 


Pulse Ox  96   07/14/24 07:28


 


FiO2      








                                 Intake & Output











 07/13/24 07/14/24 07/14/24





 18:59 06:59 18:59


 


Other:   


 


  Voiding Method  Toilet Toilet


 


  # Voids 1 6 














- Labs


CBC & Chem 7: 


                                 07/14/24 03:40





                                 07/14/24 03:40


Labs: 


                  Abnormal Lab Results - Last 24 Hours (Table)











  07/13/24 07/14/24 07/14/24 Range/Units





  05:53 03:40 03:40 


 


WBC  13.77 H  11.11 H   (4.50-10.00)  X 10*3/uL


 


RBC  3.05 L  2.97 L   (4.10-5.20)  X 10*6/uL


 


Hgb  10.4 L  10.4 L   (12.0-15.0)  g/dL


 


Hct  30.9 L  30.0 L   (37.2-46.3)  %


 


MCV  101.3 H  101.0 H   (80.0-97.0)  FL


 


MCH  34.1 H  35.0 H   (27.0-32.0)  pg


 


Immature Gran #  0.07 H  0.10 H   (0.00-0.04)  X 10*3/uL


 


Neutrophils #  9.51 H    (1.80-7.70)  X 10*3/uL


 


Monocytes #  1.72 H  1.36 H   (0.20-1.00)  X 10*3/uL


 


Sodium    133 L  (135-145)  mmol/L


 


Anion Gap    12.20 H  (4.00-12.00)  mmol/L


 


BUN    8.4 L  (9.0-27.0)  mg/dL


 


Creatinine    0.5 L  (0.6-1.5)  mg/dL








                      Microbiology - Last 24 Hours (Table)











 07/08/24 18:11 Blood Culture - Final





 Blood

## 2024-07-15 VITALS — TEMPERATURE: 98.4 F | DIASTOLIC BLOOD PRESSURE: 63 MMHG | HEART RATE: 74 BPM | SYSTOLIC BLOOD PRESSURE: 117 MMHG

## 2024-07-15 VITALS — RESPIRATION RATE: 15 BRPM

## 2024-07-15 NOTE — P.DS
Providers


Date of admission: 


07/11/24 10:32





Expected date of discharge: 07/15/24


Attending physician: 


Xavier Hernandez





Consults: 





                                        





07/08/24 19:13


Consult Physician Routine 


   Consulting Provider: Fara Masterson


   Consult Reason/Comments: C.diificile colitis


   Do you want consulting provider notified?: Yes











Primary care physician: 


Xavier Hernandez





[facility] Course: 





HISTORY OF PRESENT ILLNESS:





This is an 79-year-old  female with a previous medical history 

significant for hypertension and hypertensive cardiovascular disease, 

hyperlipidemia, history of osteoarthritis, spondylosis of the lumbar spine 

thoracic spine, history of osteopenia, patient was recently hospitalized at 

[facility] after she was admitted for severe abdominal pain 

associated with severe diarrhea she was diagnosed with C. difficile colitis, she

was treated with vancomycin 125 mg orally 4 times every day for about 4 weeks, 

and she came to the office for evaluation and follow-up few days ago, and she w

as feeling a bit better, however she presented to the yesterday to the office 

with increased dizziness lightheadedness not able to keep anything down and 

severe diarrhea not able to tolerate anything she has not been eating anything 

at this time, she patient was hypotensive she was referred to the emergency 

department for evaluation had a CT deferred toxins came back positive, she was 

started on IV fluid resuscitation, she was placed on vancomycin to 50 mg orally 

4 times every day, consulted infectious disease to see if the patient is a 

candidate for Dificid 200 mg orally twice every day since she had failed the 

first management, patient also will be started on Questran 4 g orally twice 

every day, she will place on probiotic as well, I will admit the patient to the 

hospital because of significant hypovolemic hyponatremia and significant 

orthostatic changes.





7/10: Patient is laying down in bed continues to have loose stool, better than 

he was yesterday, continues to have abdominal cramps associate with nausea but 

no vomiting, she continues to have a loose bowel movement every time she has 

anything to eat, she has no fever or chills at this time, she seems to be 

tolerating the new medication Dr. Masterson has switched her to Dificid 200 mg 

orally twice every day vancomycin was stopped, we will continue to follow-up the

patient very closely, I will discontinue IV fluid at this time, if her symptoms 

are not better we will obtain CT scan of the abdomen pelvis tomorrow morning if 

she is not getting better.





7/11: Patient is laying down in bed, she continues to have diarrhea, she con

tinues to have increased abdominal pain, she underwent CT scan of the abdomen 

pelvis that showed evidence of mild colitis, diverticulosis, possible 

perisplenic edema, minimal anasarca due to fluid overload, discontinue IV fluid,

give the patient Lasix 20 mg IV push x 1, continue Dificid 200 mg orally twice 

every day, continue Questran 4 g orally twice every day, avoid dairy products, 

avoid vegetables and fresh fruits, continue yogurt, monitor the patient very 

closely, discussed with her  and with her at the bedside.





7/12: Patient is feeling better today, she is less short of breath, her IV fluid

is Hep-Lock, she is ambulating a bit better today, she continues to have loose 

stool, better than yesterday, she is able to tolerate her diet, less abdominal 

cramping, will continue to monitor the patient very closely hopefully home in 

the next 24 hours, patient white count is down and we will continue to monitor 

CBC over the next 24 hours.





7/13: Patient is feeling much better today, has not had any diarrhea today, she 

is tolerating her breakfast very well, white count still pending, continue with 

Dificid 200 mg orally twice every day, continue Questran 4 g orally twice every 

day, repeat her labs tomorrow morning, likely she will be able to be discharged 

home tomorrow morning, her headaches are about the same she is likely suffering 

from occipital neuralgia, I will start the patient on Fioricet 1 tablet every 4 

hours as needed discontinue Tylenol continue Motrin as needed, increase 

activity, hopefully home in the next 1 to 2 days.





7/14: Patient is sitting up in bed that she is feeling better today she 

continues to have some cramps in the lower abdomen, her stool is not soft 

anymore, she has a good bowel movement today, she has no fever or chills at this

time, she was taken off Questran by infectious disease yesterday, increase oral 

intake of fluid, increase oral intake of food, patient will likely be discharged

home tomorrow morning.





7/15: Patient sitting up in a chair no apparent distress, she did have a normal 

bowel movement today, she is tolerating her food very well, she has no chest 

pain or shortness of breath, she has no fever or chills, her labs are back to 

normal, patient is moving around well, she will be discharged home today and 

follow-up with me as an outpatient in the next week.








Discharge diagnoses:





1.  Recurrent C. difficile colitis with SIRS. 





2.  Hypovolemic hyponatremia.  





3.  Hypertension and hypertensive cardiovascular disease. 





4.  Mixed hyperlipidemia.  





5.  Osteopenia.  





6.  Vitamin D deficiency. 





7.  Osteoarthritis. 





8.  Occipital neuralgia. 


Patient Condition at Discharge: Fair





Plan - Discharge Summary


Discharge Rx Participant: Yes


New Discharge Prescriptions: 


New


   Fidaxomicin [Dificid] 200 mg PO BID #20 tablet





Continue


   Aspirin 81 mg PO DAILY


   Multivitamin/Iron/Folic Acid [Centrum Complete Multivit Tab] 1 tab PO DAILY


   Calcium Carbonate/Vitamin D3 [Caltrate 600 Plus D3 Tablet] 1 tab PO BID


   Folic Acid 800 mg PO DAILY


   Losartan Potassium 100 mg PO DAILY


   Cholecalciferol [Vitamin D3 (125 Mcg = 5000 Iu)] 125 mcg PO DAILY


   carvediloL [Coreg] 37.5 mg PO BID


   Vit C/E/Zn/Coppr/Lutein/Zeaxan [Preservision Areds 2 Softgel] 1 cap PO DAILY


   Rosuvastatin [Crestor] 10 mg PO DAILY


   Alendronate Sodium [Fosamax] 70 mg PO JOHNSON


   Spironolactone 12.5 mg PO DAILY





Discontinued


   Cholestyramine/Aspartame [Cholestyramine Light Packet] 4 gm PO DAILY


Discharge Medication List





Aspirin 81 mg PO DAILY 05/13/14 [History]


Multivitamin/Iron/Folic Acid [Centrum Complete Multivit Tab] 1 tab PO DAILY 

05/13/14 [History]


Calcium Carbonate/Vitamin D3 [Caltrate 600 Plus D3 Tablet] 1 tab PO BID 06/11/14

[History]


Folic Acid 800 mg PO DAILY 06/12/14 [History]


Losartan Potassium 100 mg PO DAILY 11/24/14 [History]


Alendronate Sodium [Fosamax] 70 mg PO JOHNSON 06/05/24 [History]


Cholecalciferol [Vitamin D3 (125 Mcg = 5000 Iu)] 125 mcg PO DAILY 06/05/24 

[History]


Rosuvastatin [Crestor] 10 mg PO DAILY 06/05/24 [History]


Spironolactone 12.5 mg PO DAILY 06/05/24 [History]


Vit C/E/Zn/Coppr/Lutein/Zeaxan [Preservision Areds 2 Softgel] 1 cap PO DAILY 

06/05/24 [History]


carvediloL [Coreg] 37.5 mg PO BID 06/05/24 [History]


Fidaxomicin [Dificid] 200 mg PO BID #20 tablet 07/09/24 [Rx]








Follow up Appointment(s)/Referral(s): 


Xavier Hernandez MD [Primary Care Provider] - 07/17/24 12:15 pm


Patient Instructions/Handouts:  C. Diff (Clostridioides Difficile) Infection 

(DC)


Discharge Disposition: HOME SELF-CARE

## 2024-07-16 NOTE — P.PN
Subjective


Progress Note Date: 07/15/24


Principal diagnosis: 





Reason for follow-up is C. difficile colitis





Patient is a 79-year-old  female with a past medical history 

significant for hypertension recently exposed antibiotic for the right lower 

extremity laceration subsequent developing C. difficile colitis that has been 

treated with oral vancomycin with persistent symptoms the patient has been 

admitted to the hospital.


On today's evaluation that is 07/15/2024,the patient denies any fever or any 

chills, patient is breathing comfortably on room air, the patient denies  chest 

pain shortness of breath and no significant cough, patient denies abdominal 

pain, no nausea vomiting and did have improvement her diarrhea has been 

complaining of some headache today.


Patient did not have any laboratory work was noted 11,000 yesterday blood 

culture has been negative








Objective





- Vital Signs


Vital signs: 


                                   Vital Signs











Temp  98.4 F   07/15/24 07:03


 


Pulse  74   07/15/24 07:03


 


Resp  15   07/15/24 07:03


 


BP  117/63   07/15/24 07:03


 


Pulse Ox  95   07/15/24 07:03


 


FiO2      








                                 Intake & Output











 07/14/24 07/15/24 07/15/24





 18:59 06:59 18:59


 


Other:   


 


  Voiding Method Toilet Toilet Toilet


 


  # Voids 8 1 


 


  # Bowel Movements 2  














- Exam





GENERAL DESCRIPTION: An elderly female up in the chair in no distress





RESPIRATORY SYSTEM: Unlabored breathing , decreased breath sounds at bases





HEART: S1 S2 regular rate and rhythm ,





ABDOMEN: Soft , no tenderness





EXTREMITIES: No edema feet





- Labs


CBC & Chem 7: 


                                 07/14/24 03:40





                                 07/14/24 03:40





Assessment and Plan


(1) C. difficile colitis


Status: Acute   Code(s): A04.72 - ENTEROCOLITIS D/T CLOSTRIDIUM DIFFICILE, NOT 

SPCF AS RECUR   SNOMED Code(s): 023163988


   





(2) Leukocytosis


Status: Acute   Code(s): D72.829 - ELEVATED WHITE BLOOD CELL COUNT, UNSPECIFIED 

 SNOMED Code(s): 095261870


   


Plan: 





1patient presented hospital with severe diarrhea that has been going on for the

last few weeks and apparently the patient has failed outpatient oral vancomycin 

therapy


2-patient did have resolution of her diarrhea and white count down to 11,000 

yesterday no CBC was done today she will continue with Dificid to finish her 

course of therapy and close outpatient follow-up


Dictation was produced using dragon dictation software. please excuse any 

grammatical, word or spelling errors. 








Time with Patient: Less than 30

## 2024-07-24 ENCOUNTER — HOSPITAL ENCOUNTER (OUTPATIENT)
Dept: HOSPITAL 47 - RADMAMWWP | Age: 79
Discharge: HOME | End: 2024-07-24
Attending: INTERNAL MEDICINE
Payer: MEDICARE

## 2024-07-24 DIAGNOSIS — Z80.3: ICD-10-CM

## 2024-07-24 DIAGNOSIS — Z78.0: ICD-10-CM

## 2024-07-24 DIAGNOSIS — N63.10: Primary | ICD-10-CM

## 2024-07-24 NOTE — USB
Reason for Exam: Clinical finding. 





Patient History: 

Menarche at age 10. First Full-Term Pregnancy at age 20. Postmenopausal. Other cancer. Patient used

Hormonal Contraceptives for 5 years.

Daughter had breast cancer, age 43. Daughter had breast cancer, age 49. Mother had breast cancer,

age 89. 





Risk Values: 

Aminata 5 year model risk: 6.5%.

NCI Lifetime model risk: 9.3%.

Technique: 

Method: Targeted.  





Prior Study Comparison: 

3/15/2022 Bilateral Screening Mammogram, [facility]. 3/16/2023 Bilateral MG 3D screening mammo w/cad, [facility].

3/18/2024 Bilateral MG 3D screening mammo w/cad, [facility]. 





Findings: 

The area of palpable concern of the right breast, the axilla of the right breast and the

retroareolar of the right breast were scanned.

Solid mass at the right 3:00 location 9 cm from the nipple measuring 1.9 x 1.2 x 1.1 cm. No

additional masses seen within the visualized right breast. No adenopathy present. Tissue diagnosis

is advised. 





Overall Assessment: Suspicious, BI-RAD 4





Management: 

Ultrasound Core Biopsy of the right breast.

A clinical breast exam by your physician is recommended on an annual basis and results should be

correlated with mammographic findings.  This exam should not preclude additional follow-up of

suspicious palpable abnormalities.  Results were given to the patient verbally at the time of exam.



Electronically signed and approved by: Leopold M. Fregoli, M.D. Radiologis

## 2024-08-21 ENCOUNTER — HOSPITAL ENCOUNTER (OUTPATIENT)
Dept: HOSPITAL 47 - RADUSWWP | Age: 79
End: 2024-08-21
Attending: SURGERY
Payer: MEDICARE

## 2024-08-21 DIAGNOSIS — Z79.82: ICD-10-CM

## 2024-08-21 DIAGNOSIS — Z88.3: ICD-10-CM

## 2024-08-21 DIAGNOSIS — Z88.6: ICD-10-CM

## 2024-08-21 DIAGNOSIS — D05.11: Primary | ICD-10-CM

## 2024-08-21 DIAGNOSIS — Z78.0: ICD-10-CM

## 2024-08-21 DIAGNOSIS — Z80.3: ICD-10-CM

## 2024-08-21 DIAGNOSIS — Z17.0: ICD-10-CM

## 2024-08-21 DIAGNOSIS — Z88.2: ICD-10-CM

## 2024-08-21 DIAGNOSIS — Z88.5: ICD-10-CM

## 2024-08-21 DIAGNOSIS — Z79.899: ICD-10-CM

## 2024-08-21 PROCEDURE — 88341 IMHCHEM/IMCYTCHM EA ADD ANTB: CPT

## 2024-08-21 PROCEDURE — 88305 TISSUE EXAM BY PATHOLOGIST: CPT

## 2024-08-21 PROCEDURE — 77065 DX MAMMO INCL CAD UNI: CPT

## 2024-08-21 PROCEDURE — 77063 BREAST TOMOSYNTHESIS BI: CPT

## 2024-08-21 PROCEDURE — 88342 IMHCHEM/IMCYTCHM 1ST ANTB: CPT

## 2024-08-21 PROCEDURE — 77067 SCR MAMMO BI INCL CAD: CPT

## 2024-09-05 ENCOUNTER — HOSPITAL ENCOUNTER (OUTPATIENT)
Dept: HOSPITAL 47 - OR | Age: 79
Discharge: HOME | End: 2024-09-05
Attending: SURGERY
Payer: MEDICARE

## 2024-09-05 VITALS — DIASTOLIC BLOOD PRESSURE: 79 MMHG | SYSTOLIC BLOOD PRESSURE: 162 MMHG | HEART RATE: 67 BPM | RESPIRATION RATE: 17 BRPM

## 2024-09-05 VITALS — TEMPERATURE: 96.8 F

## 2024-09-05 DIAGNOSIS — Z88.8: ICD-10-CM

## 2024-09-05 DIAGNOSIS — Z17.0: ICD-10-CM

## 2024-09-05 DIAGNOSIS — Z80.3: ICD-10-CM

## 2024-09-05 DIAGNOSIS — Z88.6: ICD-10-CM

## 2024-09-05 DIAGNOSIS — K21.9: ICD-10-CM

## 2024-09-05 DIAGNOSIS — Z88.2: ICD-10-CM

## 2024-09-05 DIAGNOSIS — M81.0: ICD-10-CM

## 2024-09-05 DIAGNOSIS — Z88.3: ICD-10-CM

## 2024-09-05 DIAGNOSIS — I10: ICD-10-CM

## 2024-09-05 DIAGNOSIS — D05.11: Primary | ICD-10-CM

## 2024-09-05 DIAGNOSIS — E78.2: ICD-10-CM

## 2024-09-05 DIAGNOSIS — Z79.899: ICD-10-CM

## 2024-09-05 DIAGNOSIS — Z88.5: ICD-10-CM

## 2024-09-05 DIAGNOSIS — Z79.82: ICD-10-CM

## 2024-09-05 PROCEDURE — 19301 PARTIAL MASTECTOMY: CPT

## 2024-09-05 PROCEDURE — 88341 IMHCHEM/IMCYTCHM EA ADD ANTB: CPT

## 2024-09-05 PROCEDURE — 77065 DX MAMMO INCL CAD UNI: CPT

## 2024-09-05 PROCEDURE — 76098 X-RAY EXAM SURGICAL SPECIMEN: CPT

## 2024-09-05 PROCEDURE — 88307 TISSUE EXAM BY PATHOLOGIST: CPT

## 2024-09-05 PROCEDURE — 19285 PERQ DEV BREAST 1ST US IMAG: CPT

## 2024-09-05 PROCEDURE — 88342 IMHCHEM/IMCYTCHM 1ST ANTB: CPT

## 2024-09-05 RX ADMIN — LIDOCAINE HYDROCHLORIDE ONE ML: 10 INJECTION, SOLUTION INFILTRATION; PERINEURAL at 12:15

## 2024-09-05 RX ADMIN — HEPARIN SODIUM PRN UNIT: 5000 INJECTION, SOLUTION INTRAVENOUS; SUBCUTANEOUS at 12:59

## 2024-09-05 RX ADMIN — POTASSIUM CHLORIDE SCH MLS/HR: 14.9 INJECTION, SOLUTION INTRAVENOUS at 11:03

## 2024-09-05 RX ADMIN — ONDANSETRON ONE MG: 2 INJECTION INTRAMUSCULAR; INTRAVENOUS at 11:08

## 2024-09-05 RX ADMIN — DEXAMETHASONE SODIUM PHOSPHATE ONE MG: 4 INJECTION, SOLUTION INTRAMUSCULAR; INTRAVENOUS at 11:08

## 2024-09-05 RX ADMIN — KETOROLAC TROMETHAMINE STA MG: 15 INJECTION, SOLUTION INTRAMUSCULAR; INTRAVENOUS at 15:30

## 2024-09-05 RX ADMIN — FENTANYL CITRATE PRN MCG: 50 INJECTION, SOLUTION INTRAMUSCULAR; INTRAVENOUS at 14:26

## 2024-09-05 RX ADMIN — BUPIVACAINE HYDROCHLORIDE ONE ML: 2.5 INJECTION, SOLUTION EPIDURAL; INFILTRATION; INTRACAUDAL; PERINEURAL at 13:40

## 2024-09-05 RX ADMIN — POTASSIUM CHLORIDE ONE MLS: 14.9 INJECTION, SOLUTION INTRAVENOUS at 11:03

## 2024-09-05 RX ADMIN — BUPIVACAINE HYDROCHLORIDE ONE ML: 2.5 INJECTION, SOLUTION EPIDURAL; INFILTRATION; INTRACAUDAL; PERINEURAL at 13:57

## 2024-09-11 NOTE — MM
Reason for Exam: Post Procedure Mammogram. 

Last screening mammogram was performed 5 month(s) ago.





Patient History: 

Menarche at age 10. First Full-Term Pregnancy at age 20. Postmenopausal. Other cancer. Patient used

Hormonal Contraceptives for 5 years.

Daughter had breast cancer, age 43. Daughter had breast cancer, age 49. Mother had breast cancer,

age 89. 





Risk Values: 

Aminata 5 year model risk: 6.5%.

NCI Lifetime model risk: 9.3%.





Prior Study Comparison: 

3/15/2022 Bilateral Screening Mammogram, [facility]. 3/16/2023 Bilateral MG 3D screening mammo w/cad, [facility].

3/18/2024 Bilateral MG 3D screening mammo w/cad, [facility]. 7/24/2024 Right US breast limited RT, [facility]. 





Tissue Density: 

Right: The breasts are heterogeneously dense, which may obscure small masses.

Pathology Description: 

Location: 3 o'clock.

Marker Left Behind.

Needle Type: Spotted         Cores: 4 Gauge: 12

The procedure of ultrasound guided core biopsy was explained to the patient. Benefits, alternatives,

and risks were discussed. An informed consent was then obtained.



The patient was placed in supine positioning for imaging and for the procedure. The overlying skin

was prepped and draped in usual sterile fashion. Lidocaine buffered with bicarbonate was used as

anesthetic into the skin and subcutaneous tissue up to area of concern in the right 3:00 breast.



Under ultrasound guidance, a 12-gauge vacuum assisted biopsy gun device was used to obtain 4 core

samples. Following this, a biopsy clip was left in lesion.



The patient tolerated the procedure well without any immediate complication. The patient was kept in

the radiology department for short stay after the procedure and then discharged home in stable

condition.



Postprocedure mammogram: The patient was transferred to mammography for physician ordered post

procedure mammogram for clip placement verification.







Impression: Successful, uncomplicated ultrasound guided core biopsy of area of concern in the right

3:00 breast, full pathology results to follow. 





Pathology Results: 

Result: Malignant, Invasive ductal carcinoma.

Pathology and radiology were reviewed. Findings are concordant.



RIGHT BREAST 3:00, ULTRASOUND GUIDED CORE BIOPSY: Invasive ductal carcinoma with apocrine features,

preliminarily Grade 2. See Surgical Pathology Cancer Case Summary and comment. 





Overall Assessment: Malignant





Assessment: MG diagnostic mammo RT wo CAD - Right: Known biopsy proven malignancy, BI-RAD 6. 





Management: 

Surgical Consultation of the right breast.

Electronically signed and approved by: Leopold M. Fregoli, M.D. Radiologis

## 2024-10-07 NOTE — P.CONS
History of Present Illness





- Reason for Consult


Consult date: 07/09/24


C. difficile colitis


Requesting physician: Xavier Hernandez





- Chief Complaint


Diarrhea x weeks





- History of Present Illness





Patient is a 79-year-old  female with a past medical history 

significant for hypertension apparently developed a laceration of the right 

lower extremity for the patient was evaluated at [facility] and

the patient has been treated with a course of oral Keflex patient mention she 

was having diarrhea while on cephalexin that has subsequently got worse for the 

patient was evaluated at an outside facility patient has been diagnosed her with

the C. difficile colitis and has been treated with oral vancomycin 125 mg p.o. 

every 6 hours however the patient continued to have diarrhea describing it 

multiple loose stool almost 20 times per day patient did have watery stools no 

blood or mucus in the stool having crampy lower abdominal pain moderate 

intensity without radiation some nausea but no vomiting denies high-grade fever 

with the symptoms the patient has been admitted to the hospital on presentation 

to the hospital the patient was afebrile and no fever Recorded subsequently 

patient was not tachycardic hypotensive or hypoxic and no need for supplemental 

oxygen white count was 10.33 with a left shift creatinine is 0.8 swelling was 

noted rest of electrolytes are normal liver enzymes are normal urine mildly 

positive stool for C. difficile positive patient was started on oral vancomycin 

has been admitted to hospital infectious disease was consulted for further 

management of antibiotic therapy





Review of Systems





Positive point and negatives has been  mentioned in the HPI, complete review of 

systems was performed and all other systems are negative





Past Medical History


Past Medical History: Cancer, Hypertension, Skin Disorder


Additional Past Medical History / Comment(s): SKIN CANCER, has a cold sore on 

lip. c diff.


History of Any Multi-Drug Resistant Organisms: None Reported


Past Surgical History: Joint Replacement, Orthopedic Surgery, Tubal Ligation


Additional Past Surgical History / Comment(s): SURGERY FOR SKIN CANCER-LEG,  HIP

ORIF, femur fx, bilateral knees replaced, RLE hematoma I and D


Past Anesthesia/Blood Transfusion Reactions: No Reported Reaction


Past Psychological History: No Psychological Hx Reported


Smoking Status: Never smoker


Past Alcohol Use History: Daily


Past Drug Use History: None Reported





Medications and Allergies


                                Home Medications











 Medication  Instructions  Recorded  Confirmed  Type


 


Aspirin 81 mg PO DAILY 05/13/14 07/08/24 History


 


Multivitamin/Iron/Folic Acid 1 tab PO DAILY 05/13/14 07/08/24 History





[Centrum Complete Multivit Tab]    


 


Calcium Carbonate/Vitamin D3 1 tab PO BID 06/11/14 07/08/24 History





[Caltrate 600 Plus D3 Tablet]    


 


Folic Acid 800 mg PO DAILY 06/12/14 07/08/24 History


 


Losartan Potassium 100 mg PO DAILY 11/24/14 07/08/24 History


 


Alendronate Sodium [Fosamax] 70 mg PO JOHNSON 06/05/24 07/08/24 History


 


Cholecalciferol [Vitamin D3 (125 125 mcg PO DAILY 06/05/24 07/08/24 History





Mcg = 5000 Iu)]    


 


Rosuvastatin [Crestor] 10 mg PO DAILY 06/05/24 07/08/24 History


 


Spironolactone 12.5 mg PO DAILY 06/05/24 07/08/24 History


 


Vit C/E/Zn/Coppr/Lutein/Zeaxan 1 cap PO DAILY 06/05/24 07/08/24 History





[Preservision Areds 2 Softgel]    


 


carvediloL [Coreg] 37.5 mg PO BID 06/05/24 07/08/24 History


 


Cholestyramine/Aspartame 4 gm PO DAILY 07/08/24 07/08/24 History





[Cholestyramine Light Packet]    


 


Fidaxomicin [Dificid] 200 mg PO BID #20 tablet 07/09/24  Rx








                                    Allergies











Allergy/AdvReac Type Severity Reaction Status Date / Time


 


codeine Allergy  Rash/Hives Verified 07/08/24 20:30


 


hydrocodone bitartrate Allergy  Itching Verified 07/08/24 20:30





[From [place]]     


 


metoprolol Allergy  Rash/Hives Verified 07/08/24 20:30


 


Sulfa (Sulfonamide AdvReac  LIGHT Verified 07/08/24 20:30





Antibiotics)   HEADED,  





   VERTIGO  


 


sulfamethoxazole AdvReac  LIGHT Verified 07/08/24 20:30





[From Bactrim]   HEADED,  





   VERTIGO  


 


tramadol AdvReac  VERTIGO Verified 07/08/24 20:30


 


trimethoprim [From Bactrim] AdvReac  LIGHT Verified 07/08/24 20:30





   HEADED,  





   VERTIGO  














Physical Exam


Vitals: 


                                   Vital Signs











  Temp Pulse Pulse Resp BP BP Pulse Ox


 


 07/09/24 07:17  97.9 F   76  16   153/81  95


 


 07/09/24 01:30  97.5 F L   81  19   114/56  95


 


 07/08/24 22:14  97.9 F   92  18   158/63  99


 


 07/08/24 21:46   94   16  166/74   97


 


 07/08/24 16:42  97.6 F  84   20  117/75   99








                                Intake and Output











 07/08/24 07/09/24 07/09/24





 22:59 06:59 14:59


 


Other:   


 


  # Voids  1 1


 


  # Bowel Movements  2 


 


  Weight 62.142 kg  














GENERAL DESCRIPTION: Elderly female lying in bed, no distress. No tachypnea or 

accessory muscle of respiration use.


HEENT: Shows Pallor , no scleral icterus. Oral mucous membrane is dry. No 

pharyngeal erythema or thrush


NECK: Trachea central, no thyromegaly.


LUNGS: Unlabored breathing. Clear to auscultation anteriorly. No wheeze or 

crackle.


HEART: S1, S2, regular rate and rhythm. No loud murmur


ABDOMEN: Soft, no tenderness ,


EXTREMITIES: No edema of feet.


SKIN: No rash, no masses palpable.


NEUROLOGICAL: The patient is awake, alert, oriented x3, mood and affect normal.














Results


CBC & Chem 7: 


                                 07/09/24 05:03





                                 07/09/24 05:03


Labs: 


                  Abnormal Lab Results - Last 24 Hours (Table)











  07/08/24 07/08/24 07/09/24 Range/Units





  16:57 16:57 05:03 


 


WBC    10.33 H  (4.50-10.00)  X 10*3/uL


 


RBC  2.56 L   3.10 L  (3.80-5.40)  m/uL


 


Hgb  9.9 L   10.4 L  (11.4-16.0)  gm/dL


 


Hct  26.6 L   31.3 L  (34.0-46.0)  %


 


MCV  103.7 H   101.0 H  (80.0-100.0)  fL


 


MCH  38.7 H   33.5 H  (25.0-35.0)  pg


 


MCHC  37.4 H    (31.0-37.0)  g/dL


 


Monocytes #    1.99 H  (0.20-1.00)  X 10*3/uL


 


Sodium   127 L   (137-145)  mmol/L


 


Chloride   96 L   ()  mmol/L


 


Carbon Dioxide     (21.6-31.8)  mmol/L


 


Anion Gap     (4.00-12.00)  mmol/L


 


BUN     (9.0-27.0)  mg/dL


 


BUN/Creatinine Ratio     (12.00-20.00)  Ratio


 


Glucose   107 H   (74-99)  mg/dL


 


Magnesium   1.2 L   (1.6-2.3)  mg/dL


 


Total Protein   6.2 L   (6.3-8.2)  g/dL


 


Albumin     (3.8-4.9)  g/dL


 


Urine Ketones     (Negative)  


 


Ur Leukocyte Esterase     (Negative)  


 


Urine WBC     (0-5)  /hpf


 


Urine Bacteria     (None)  /hpf


 


Urine Mucus     (None)  /hpf














  07/09/24 07/09/24 Range/Units





  05:03 07:26 


 


WBC    (4.50-10.00)  X 10*3/uL


 


RBC    (3.80-5.40)  m/uL


 


Hgb    (11.4-16.0)  gm/dL


 


Hct    (34.0-46.0)  %


 


MCV    (80.0-100.0)  fL


 


MCH    (25.0-35.0)  pg


 


MCHC    (31.0-37.0)  g/dL


 


Monocytes #    (0.20-1.00)  X 10*3/uL


 


Sodium  130 L   (137-145)  mmol/L


 


Chloride    ()  mmol/L


 


Carbon Dioxide  20.9 L   (21.6-31.8)  mmol/L


 


Anion Gap  12.10 H   (4.00-12.00)  mmol/L


 


BUN  8.1 L   (9.0-27.0)  mg/dL


 


BUN/Creatinine Ratio  10.12 L   (12.00-20.00)  Ratio


 


Glucose    (74-99)  mg/dL


 


Magnesium    (1.6-2.3)  mg/dL


 


Total Protein  5.5 L   (6.3-8.2)  g/dL


 


Albumin  3.5 L   (3.8-4.9)  g/dL


 


Urine Ketones   1+ H  (Negative)  


 


Ur Leukocyte Esterase   Large H  (Negative)  


 


Urine WBC   50 H  (0-5)  /hpf


 


Urine Bacteria   Few H  (None)  /hpf


 


Urine Mucus   Rare H  (None)  /hpf














Assessment and Plan


(1) C. difficile colitis


Current Visit: Yes   Status: Acute   Code(s): A04.72 - ENTEROCOLITIS D/T 

CLOSTRIDIUM DIFFICILE, NOT SPCF AS RECUR   SNOMED Code(s): 308966473


   


Plan: 





1patient presented hospital with severe diarrhea that has been going on for the

last few weeks and apparently the patient has failed outpatient oral vancomycin 

therapy


2-we will discontinue vancomycin


3-start the patient on Dificid 200 mg twice a day


4-patient has been encouraged to increase her probiotic and yogurt intake


We will follow on clinical condition and cultures to further adjust medication 

if needed


Thank you for this consultation we will follow the patient along with you


Dictation was produced using dragon dictation software. please excuse any gramm

atical, word or spelling errors. 








Time with Patient: Greater than 30
Epidural

## 2025-04-01 ENCOUNTER — HOSPITAL ENCOUNTER (OUTPATIENT)
Dept: HOSPITAL 47 - RADMAMWWP | Age: 80
Discharge: HOME | End: 2025-04-01
Attending: SURGERY
Payer: MEDICARE

## 2025-04-01 DIAGNOSIS — Z92.0: ICD-10-CM

## 2025-04-01 DIAGNOSIS — R92.8: Primary | ICD-10-CM

## 2025-04-01 DIAGNOSIS — Z80.3: ICD-10-CM

## 2025-04-01 DIAGNOSIS — Z78.0: ICD-10-CM

## 2025-04-01 DIAGNOSIS — R92.333: ICD-10-CM

## 2025-04-01 DIAGNOSIS — Z85.3: ICD-10-CM

## 2025-04-01 PROCEDURE — 77062 BREAST TOMOSYNTHESIS BI: CPT

## 2025-04-01 PROCEDURE — 77066 DX MAMMO INCL CAD BI: CPT
